# Patient Record
Sex: FEMALE | Race: WHITE | Employment: FULL TIME | URBAN - METROPOLITAN AREA
[De-identification: names, ages, dates, MRNs, and addresses within clinical notes are randomized per-mention and may not be internally consistent; named-entity substitution may affect disease eponyms.]

---

## 2018-03-20 ENCOUNTER — OFFICE VISIT (OUTPATIENT)
Dept: PRIMARY CARE CLINIC | Age: 38
End: 2018-03-20
Payer: COMMERCIAL

## 2018-03-20 VITALS
TEMPERATURE: 97.7 F | WEIGHT: 137 LBS | HEIGHT: 67 IN | OXYGEN SATURATION: 99 % | BODY MASS INDEX: 21.5 KG/M2 | DIASTOLIC BLOOD PRESSURE: 76 MMHG | HEART RATE: 81 BPM | SYSTOLIC BLOOD PRESSURE: 122 MMHG

## 2018-03-20 DIAGNOSIS — M79.644 PAIN OF FINGER OF RIGHT HAND: ICD-10-CM

## 2018-03-20 DIAGNOSIS — H61.22 IMPACTED CERUMEN OF LEFT EAR: ICD-10-CM

## 2018-03-20 DIAGNOSIS — Z00.00 ANNUAL PHYSICAL EXAM: Primary | ICD-10-CM

## 2018-03-20 PROCEDURE — 99395 PREV VISIT EST AGE 18-39: CPT | Performed by: FAMILY MEDICINE

## 2018-03-20 PROCEDURE — G0444 DEPRESSION SCREEN ANNUAL: HCPCS | Performed by: FAMILY MEDICINE

## 2018-03-20 ASSESSMENT — PATIENT HEALTH QUESTIONNAIRE - PHQ9
8. MOVING OR SPEAKING SO SLOWLY THAT OTHER PEOPLE COULD HAVE NOTICED. OR THE OPPOSITE, BEING SO FIGETY OR RESTLESS THAT YOU HAVE BEEN MOVING AROUND A LOT MORE THAN USUAL: 0
1. LITTLE INTEREST OR PLEASURE IN DOING THINGS: 2
5. POOR APPETITE OR OVEREATING: 0
4. FEELING TIRED OR HAVING LITTLE ENERGY: 3
SUM OF ALL RESPONSES TO PHQ9 QUESTIONS 1 & 2: 3
6. FEELING BAD ABOUT YOURSELF - OR THAT YOU ARE A FAILURE OR HAVE LET YOURSELF OR YOUR FAMILY DOWN: 3
SUM OF ALL RESPONSES TO PHQ QUESTIONS 1-9: 13
7. TROUBLE CONCENTRATING ON THINGS, SUCH AS READING THE NEWSPAPER OR WATCHING TELEVISION: 1
10. IF YOU CHECKED OFF ANY PROBLEMS, HOW DIFFICULT HAVE THESE PROBLEMS MADE IT FOR YOU TO DO YOUR WORK, TAKE CARE OF THINGS AT HOME, OR GET ALONG WITH OTHER PEOPLE: 3
9. THOUGHTS THAT YOU WOULD BE BETTER OFF DEAD, OR OF HURTING YOURSELF: 0
3. TROUBLE FALLING OR STAYING ASLEEP: 3
2. FEELING DOWN, DEPRESSED OR HOPELESS: 1

## 2018-03-22 ENCOUNTER — HOSPITAL ENCOUNTER (OUTPATIENT)
Dept: GENERAL RADIOLOGY | Age: 38
Discharge: HOME | End: 2018-03-24
Payer: COMMERCIAL

## 2018-03-22 ENCOUNTER — HOSPITAL ENCOUNTER (OUTPATIENT)
Age: 38
Discharge: HOME | End: 2018-03-24
Payer: COMMERCIAL

## 2018-03-22 DIAGNOSIS — M79.644 PAIN OF FINGER OF RIGHT HAND: ICD-10-CM

## 2018-03-22 PROCEDURE — 73140 X-RAY EXAM OF FINGER(S): CPT

## 2018-03-23 ENCOUNTER — NURSE ONLY (OUTPATIENT)
Dept: PRIMARY CARE CLINIC | Age: 38
End: 2018-03-23

## 2018-03-23 ENCOUNTER — HOSPITAL ENCOUNTER (OUTPATIENT)
Age: 38
Discharge: HOME | End: 2018-03-25
Payer: COMMERCIAL

## 2018-03-23 ENCOUNTER — TELEPHONE (OUTPATIENT)
Dept: PRIMARY CARE CLINIC | Age: 38
End: 2018-03-23

## 2018-03-23 DIAGNOSIS — Z00.00 ANNUAL PHYSICAL EXAM: ICD-10-CM

## 2018-03-23 DIAGNOSIS — S62.639A CLOSED AVULSION FRACTURE OF DISTAL PHALANX OF FINGER, INITIAL ENCOUNTER: Primary | ICD-10-CM

## 2018-03-23 LAB
CHOLESTEROL, TOTAL: 205 MG/DL (ref 0–199)
GLUCOSE FASTING: 83 MG/DL (ref 74–109)
HDLC SERPL-MCNC: 92 MG/DL
LDL CHOLESTEROL CALCULATED: 95 MG/DL (ref 0–99)
TRIGL SERPL-MCNC: 88 MG/DL (ref 0–149)
VLDLC SERPL CALC-MCNC: 18 MG/DL

## 2018-03-23 PROCEDURE — 80061 LIPID PANEL: CPT

## 2018-03-23 PROCEDURE — 82947 ASSAY GLUCOSE BLOOD QUANT: CPT

## 2018-03-23 NOTE — TELEPHONE ENCOUNTER
PT called back in regards to needing a orthopedic evaluation and was wanting to know if you would be putting in a referral for this. PT would appreciate a call back regarding this.

## 2018-03-27 ENCOUNTER — TELEPHONE (OUTPATIENT)
Dept: PRIMARY CARE CLINIC | Age: 38
End: 2018-03-27

## 2019-04-02 ENCOUNTER — OFFICE VISIT (OUTPATIENT)
Dept: PRIMARY CARE CLINIC | Age: 39
End: 2019-04-02
Payer: COMMERCIAL

## 2019-04-02 VITALS
SYSTOLIC BLOOD PRESSURE: 118 MMHG | OXYGEN SATURATION: 99 % | DIASTOLIC BLOOD PRESSURE: 70 MMHG | BODY MASS INDEX: 20.56 KG/M2 | TEMPERATURE: 98 F | HEART RATE: 100 BPM | HEIGHT: 67 IN | WEIGHT: 131 LBS

## 2019-04-02 DIAGNOSIS — Z00.00 ANNUAL PHYSICAL EXAM: Primary | ICD-10-CM

## 2019-04-02 DIAGNOSIS — F41.9 ANXIETY: ICD-10-CM

## 2019-04-02 DIAGNOSIS — F34.1 DYSTHYMIA: ICD-10-CM

## 2019-04-02 PROCEDURE — 99395 PREV VISIT EST AGE 18-39: CPT | Performed by: FAMILY MEDICINE

## 2019-04-02 ASSESSMENT — PATIENT HEALTH QUESTIONNAIRE - PHQ9
SUM OF ALL RESPONSES TO PHQ9 QUESTIONS 1 & 2: 0
2. FEELING DOWN, DEPRESSED OR HOPELESS: 0
SUM OF ALL RESPONSES TO PHQ QUESTIONS 1-9: 2
1. LITTLE INTEREST OR PLEASURE IN DOING THINGS: 1
SUM OF ALL RESPONSES TO PHQ QUESTIONS 1-9: 0
SUM OF ALL RESPONSES TO PHQ QUESTIONS 1-9: 0
1. LITTLE INTEREST OR PLEASURE IN DOING THINGS: 0
SUM OF ALL RESPONSES TO PHQ QUESTIONS 1-9: 2
2. FEELING DOWN, DEPRESSED OR HOPELESS: 1
SUM OF ALL RESPONSES TO PHQ9 QUESTIONS 1 & 2: 2

## 2019-04-02 NOTE — PATIENT INSTRUCTIONS
Patient Education        Advance Directives: Care Instructions  Your Care Instructions  An advance directive is a legal way to state your wishes at the end of your life. It tells your family and your doctor what to do if you can no longer say what you want. There are two main types of advance directives. You can change them any time that your wishes change. · A living will tells your family and your doctor your wishes about life support and other treatment. · A durable power of  for health care lets you name a person to make treatment decisions for you when you can't speak for yourself. This person is called a health care agent. If you do not have an advance directive, decisions about your medical care may be made by a doctor or a  who doesn't know you. It may help to think of an advance directive as a gift to the people who care for you. If you have one, they won't have to make tough decisions by themselves. Follow-up care is a key part of your treatment and safety. Be sure to make and go to all appointments, and call your doctor if you are having problems. It's also a good idea to know your test results and keep a list of the medicines you take. How can you care for yourself at home? · Discuss your wishes with your loved ones and your doctor. This way, there are no surprises. · Many states have a unique form. Or you might use a universal form that has been approved by many states. This kind of form can sometimes be completed and stored online. Your electronic copy will then be available wherever you have a connection to the Internet. In most cases, doctors will respect your wishes even if you have a form from a different state. · You don't need a  to do an advance directive. But you may want to get legal advice. · Think about these questions when you prepare an advance directive:  ? Who do you want to make decisions about your medical care if you are not able to?  Many people choose a family member or close friend. ? Do you know enough about life support methods that might be used? If not, talk to your doctor so you understand. ? What are you most afraid of that might happen? You might be afraid of having pain, losing your independence, or being kept alive by machines. ? Where would you prefer to die? Choices include your home, a hospital, or a nursing home. ? Would you like to have information about hospice care to support you and your family? ? Do you want to donate organs when you die? ? Do you want certain [facility] practices performed before you die? If so, put your wishes in the advance directive. · Read your advance directive every year, and make changes as needed. When should you call for help? Be sure to contact your doctor if you have any questions. Where can you learn more? Go to [email or web address].[email or web address] org and sign in to your [employer] account. Enter R264 in the [employer] box to learn more about \"Advance Directives: Care Instructions. \"     If you do not have an account, please click on the \"Sign Up Now\" link. Current as of: April 18, 2018  Content Version: 11.9  © 1921-1518 [employer]. Care instructions adapted under license by [facility] ([facility]). If you have questions about a medical condition or this instruction, always ask your healthcare professional. [address] any warranty or liability for your use of this information.

## 2019-04-02 NOTE — PROGRESS NOTES
kids, with increase agitation. No decrease in interest.  Fatigue. OBJECTIVE:    VS: /70   Pulse 100   Temp 98 °F (36.7 °C)   Ht 5' 6.5\" (1.689 m)   Wt 131 lb (59.4 kg)   SpO2 99%   BMI 20.83 kg/m²   General appearance: Alert, Awake, Oriented times 3, no distress  Skin: Warm and dry  Head: Normocephalic. No masses, lesions or tenderness noted  Eyes: Conjunctivae appear normal. PERRL  Ears: External ears normal, TM's clear and intact  Nose/Sinuses: Nares normal. Septum midline. Mucosa normal. No drainage  Oropharynx: Oropharynx clear with no exudate seen  Neck: Neck supple. No jugular venous distension, lymphadenopathy or thyromegaly Trachea midline. No carotid bruits  Lungs: Lungs clear to auscultation bilaterally. No rhonchi, crackles or wheezes  Heart: S1 S2  Regular rate and rhythm. No rub, murmur or gallop  Abdomen: Abdomen soft, non-tender. BS normal. No masses, organomegaly  Extremities: No edema, Peripheral pulses palpable  Musculoskeletal: Muscular strength appears intact. No joint effusion, tenderness, swelling or warmth  Neuro:  No focal motor or sensory deficits. 2+/4 L4 reflexes        ASSESSMENT/PLAN:  Bruna Apley was seen today for annual exam.    Diagnoses and all orders for this visit:    Annual physical exam  -     Lipid Panel; Future  -     Glucose, Fasting; Future    Anxiety    Dysthymia     - discussed counseling if wishes. She doesn't meds. Return if symptoms worsen or fail to improve.     Jenni Mccain, DO

## 2019-04-06 ENCOUNTER — HOSPITAL ENCOUNTER (OUTPATIENT)
Age: 39
Discharge: HOME | End: 2019-04-06
Payer: COMMERCIAL

## 2019-04-06 DIAGNOSIS — Z00.00 ANNUAL PHYSICAL EXAM: ICD-10-CM

## 2019-04-06 LAB
CHOLESTEROL, TOTAL: 216 MG/DL (ref 0–199)
GLUCOSE FASTING: 84 MG/DL (ref 74–99)
HDLC SERPL-MCNC: 93 MG/DL
LDL CHOLESTEROL CALCULATED: 110 MG/DL (ref 0–99)
TRIGL SERPL-MCNC: 67 MG/DL (ref 0–149)
VLDLC SERPL CALC-MCNC: 13 MG/DL

## 2019-04-06 PROCEDURE — 82947 ASSAY GLUCOSE BLOOD QUANT: CPT

## 2019-04-06 PROCEDURE — 80061 LIPID PANEL: CPT

## 2019-04-06 PROCEDURE — 36415 COLL VENOUS BLD VENIPUNCTURE: CPT

## 2019-06-17 ENCOUNTER — OFFICE VISIT (OUTPATIENT)
Dept: PRIMARY CARE CLINIC | Age: 39
End: 2019-06-17
Payer: COMMERCIAL

## 2019-06-17 ENCOUNTER — HOSPITAL ENCOUNTER (OUTPATIENT)
Age: 39
Discharge: HOME | End: 2019-06-19
Payer: COMMERCIAL

## 2019-06-17 VITALS
TEMPERATURE: 96.8 F | HEART RATE: 101 BPM | SYSTOLIC BLOOD PRESSURE: 118 MMHG | BODY MASS INDEX: 20.19 KG/M2 | WEIGHT: 127 LBS | OXYGEN SATURATION: 99 % | DIASTOLIC BLOOD PRESSURE: 70 MMHG

## 2019-06-17 DIAGNOSIS — M19.079 INFLAMMATION OF FOOT JOINT: ICD-10-CM

## 2019-06-17 DIAGNOSIS — M79.672 FOOT PAIN, LEFT: Primary | ICD-10-CM

## 2019-06-17 LAB
ALBUMIN SERPL-MCNC: 4.2 G/DL (ref 3.5–5.2)
ALP BLD-CCNC: 78 U/L (ref 35–104)
ALT SERPL-CCNC: 15 U/L (ref 0–32)
ANION GAP SERPL CALCULATED.3IONS-SCNC: 16 MMOL/L (ref 7–16)
AST SERPL-CCNC: 15 U/L (ref 0–31)
BILIRUB SERPL-MCNC: 0.4 MG/DL (ref 0–1.2)
BUN BLDV-MCNC: 9 MG/DL (ref 6–20)
C-REACTIVE PROTEIN: 1.3 MG/DL (ref 0–0.4)
CALCIUM SERPL-MCNC: 9.8 MG/DL (ref 8.6–10.2)
CHLORIDE BLD-SCNC: 103 MMOL/L (ref 98–107)
CO2: 19 MMOL/L (ref 22–29)
CREAT SERPL-MCNC: 0.7 MG/DL (ref 0.5–1)
GFR AFRICAN AMERICAN: >60
GFR NON-AFRICAN AMERICAN: >60 ML/MIN/1.73
GLUCOSE BLD-MCNC: 94 MG/DL (ref 74–99)
HCT VFR BLD CALC: 39.8 % (ref 34–48)
HEMOGLOBIN: 13.4 G/DL (ref 11.5–15.5)
MCH RBC QN AUTO: 30.4 PG (ref 26–35)
MCHC RBC AUTO-ENTMCNC: 33.7 % (ref 32–34.5)
MCV RBC AUTO: 90.2 FL (ref 80–99.9)
PDW BLD-RTO: 13.6 FL (ref 11.5–15)
PLATELET # BLD: 355 E9/L (ref 130–450)
PMV BLD AUTO: 10.5 FL (ref 7–12)
POTASSIUM SERPL-SCNC: 4.3 MMOL/L (ref 3.5–5)
RBC # BLD: 4.41 E12/L (ref 3.5–5.5)
RHEUMATOID FACTOR: 10 IU/ML (ref 0–13)
SEDIMENTATION RATE, ERYTHROCYTE: 30 MM/HR (ref 0–20)
SODIUM BLD-SCNC: 138 MMOL/L (ref 132–146)
TOTAL PROTEIN: 7.7 G/DL (ref 6.4–8.3)
WBC # BLD: 7.2 E9/L (ref 4.5–11.5)

## 2019-06-17 PROCEDURE — 86140 C-REACTIVE PROTEIN: CPT

## 2019-06-17 PROCEDURE — 86038 ANTINUCLEAR ANTIBODIES: CPT

## 2019-06-17 PROCEDURE — G8420 CALC BMI NORM PARAMETERS: HCPCS | Performed by: PHYSICIAN ASSISTANT

## 2019-06-17 PROCEDURE — 85651 RBC SED RATE NONAUTOMATED: CPT

## 2019-06-17 PROCEDURE — 86039 ANTINUCLEAR ANTIBODIES (ANA): CPT

## 2019-06-17 PROCEDURE — 99213 OFFICE O/P EST LOW 20 MIN: CPT | Performed by: PHYSICIAN ASSISTANT

## 2019-06-17 PROCEDURE — 85027 COMPLETE CBC AUTOMATED: CPT

## 2019-06-17 PROCEDURE — 1036F TOBACCO NON-USER: CPT | Performed by: PHYSICIAN ASSISTANT

## 2019-06-17 PROCEDURE — 86200 CCP ANTIBODY: CPT

## 2019-06-17 PROCEDURE — 86431 RHEUMATOID FACTOR QUANT: CPT

## 2019-06-17 PROCEDURE — G8427 DOCREV CUR MEDS BY ELIG CLIN: HCPCS | Performed by: PHYSICIAN ASSISTANT

## 2019-06-17 PROCEDURE — 80053 COMPREHEN METABOLIC PANEL: CPT

## 2019-06-17 RX ORDER — IBUPROFEN 800 MG/1
800 TABLET ORAL EVERY 6 HOURS PRN
Qty: 90 TABLET | Refills: 0 | Status: SHIPPED | OUTPATIENT
Start: 2019-06-17 | End: 2019-07-07 | Stop reason: SDUPTHER

## 2019-06-17 NOTE — PROGRESS NOTES
per week: Not on file     Minutes per session: Not on file    Stress: Not on file   Relationships    Social connections:     Talks on phone: Not on file     Gets together: Not on file     Attends Mandaeism service: Not on file     Active member of club or organization: Not on file     Attends meetings of clubs or organizations: Not on file     Relationship status: Not on file    Intimate partner violence:     Fear of current or ex partner: Not on file     Emotionally abused: Not on file     Physically abused: Not on file     Forced sexual activity: Not on file   Other Topics Concern    Not on file   Social History Narrative    Not on file       Review of Systems :     Constitutional: Negative for fatigue, malaise, fever, chills, appetite change and unexpected weight change. HENT: Negative for congestion, ear discharge, ear pain, facial swelling, mouth sores, postnasal drip, rhinorrhea, sinus pressure, sore throat, tinnitus and trouble swallowing. Eyes: Negative for vision changes, double vision, pain  Respiratory: Negative for cough, chest tightness, shortness of breath, chest heaviness, pleuritic pain,  wheezing. Cardiovascular: Negative for diaphoresis, chest pain, palpitations, or edema   Gastrointestinal: Negative for nausea, vomiting, abdominal pain, diarrhea, constipation, blood in stool, melena, changes in bowel habits. Endocrine: Negative for polydipsia, polyphagia and polyuria. No heat or cold intolerance. Genitourinary: Negative for dysuria, urgency, frequency, hematuria, difficulty urinating, nocturia. Musculoskeletal: as per HPI. Otherwise negative. No gait disturbance. Skin: Negative for rash, lesions, hair or nail changes. Allergic/Immunologic: Negative for environmental allergies and food allergies. Neurological: Negative for dizziness, weakness, tremors, syncope, speech difficulty, light-headedness, bowel or bladder control changes, numbness and headaches.    Hematological: Negative for adenopathy. Does not bruise/bleed easily. Psychiatric/Behavioral: Negative for suicidal ideas, behavioral problems, sleep disturbance and decreased concentration. +anxiety. Unless otherwise stated in this report or unable to obtain because of the patient's clinical or mental status as evidenced by the medical record, this patients's positive and negative responses for Review of Systems, constitutional, psych, eyes, ENT, cardiovascular, respiratory, gastrointestinal, neurological, genitourinary, musculoskeletal, integument systems and systems related to the presenting problem are either stated in the preceding or were not pertinent or were negative for the symptoms and/or complaints related to the medical problem. Physical Exam:   Vitals:    06/17/19 1553   BP: 118/70   Pulse: 101   Temp: 96.8 °F (36 °C)   SpO2: 99%   Weight: 127 lb (57.6 kg)     Constitutional:  A and O x 3, in NAD. Afebrile. Appears stated age. Head:  NCAT. Eyes:  PERRLA, EOMI without nystagmus. Conjunctiva normal. Sclera anicteric. Ears:  External ears without lesions. TM's clear bilaterally. Throat:  Pharynx without lesions. Airway patient. Mucous membranes pink and moist without lesions. Neck:  Supple. Nontender, no lymphadenopathy noted, no thyromegaly. Lungs:  Clear to auscultation and breath sounds equal.  Heart:  Regular rate and rhythm, normal S1 and S2, no m/r/g.  UE and LE distal pulses intact. Abdomen:  Soft, NTND, NABS x 4, no masses or organomegaly, no rebound or guarding. MS: Normal, painless range of motion of both LEs, there was +redness, but no warmth over cubital bone of L foot, ttp over this area, no obvious swelling noted. Knee with +creptisu on the L > R. No neurovascular deficit. 5/5 strength in UE's/LE's/ bilaterally. Skin:  No abrasions, ecchymoses, or lacerations unless noted elsewhere. Extremities  No cyanosis, clubbing, or edema noted. Neurological:   Oriented. Motor functions intact. DTRs UE and LE intact. Assessment/Plan:     Bruna Apley was seen today for mass, leg pain and cough. Diagnoses and all orders for this visit:    Foot pain, left  -     ibuprofen (ADVIL;MOTRIN) 800 MG tablet; Take 1 tablet by mouth every 6 hours as needed for Pain    Inflammation of foot joint  -     ibuprofen (ADVIL;MOTRIN) 800 MG tablet; Take 1 tablet by mouth every 6 hours as needed for Pain  -     CBC; Future  -     COMPREHENSIVE METABOLIC PANEL; Future  -     SEDIMENTATION RATE; Future  -     C-REACTIVE PROTEIN; Future  -     LILLIANA; Future  -     RHEUMATOID FACTOR; Future  -     Cyclic Citrul Peptide Antibody, IgG; Future    pt is very nervous, and anxious-I will draw all labs as above. Dr. Stacie Izquierdo was consulted, he is unsure if ? Inflammatory arthritis, perhaps tendonitis in this area. We are going to try ibuprofen 800mg at least BID, advised wearing well fitting shoes, no high heels as she has been doing. If her Sx are to worsen, go to ER. Await all results. Counseled regarding above diagnosis, including possible risks and complications,  especially if left uncontrolled. Counseled regarding the possible side effects, risks, benefits and alternatives to treatment; patient and/or guardian verbalizes understanding, agrees, feels comfortable with and wishes to proceed with above treatment plan. Advised patient to call with any new medication issues, and read all Rx info from pharmacy to assure aware of all possible risks and side effects of medication before taking. Reviewed age and gender appropriate health screening exams and vaccinations. Advised patient regarding importance of keeping up with recommended health maintenance and to schedule as soon as possible if overdue, as this is important in assessing for undiagnosed pathology, especially cancer, as well as protecting against potentially harmful/life threatening disease.        Patient and/or guardian verbalizes understanding and agrees with above counseling, assessment and plan. All questions answered.     Kartik Patino PA-C

## 2019-06-18 LAB
ANA PATTERN: ABNORMAL
ANA TITER: ABNORMAL
ANTI-NUCLEAR ANTIBODY (ANA): POSITIVE

## 2019-06-20 LAB — CCP IGG ANTIBODIES: 5 UNITS (ref 0–19)

## 2019-06-25 DIAGNOSIS — M25.572 ARTHRALGIA OF LEFT FOOT: ICD-10-CM

## 2019-06-25 DIAGNOSIS — R76.8 POSITIVE ANA (ANTINUCLEAR ANTIBODY): Primary | ICD-10-CM

## 2020-07-10 ENCOUNTER — OFFICE VISIT (OUTPATIENT)
Dept: PRIMARY CARE CLINIC | Age: 40
End: 2020-07-10
Payer: COMMERCIAL

## 2020-07-10 ENCOUNTER — HOSPITAL ENCOUNTER (OUTPATIENT)
Age: 40
Discharge: HOME | End: 2020-07-12
Payer: COMMERCIAL

## 2020-07-10 VITALS
HEIGHT: 66 IN | BODY MASS INDEX: 22.18 KG/M2 | OXYGEN SATURATION: 98 % | WEIGHT: 138 LBS | DIASTOLIC BLOOD PRESSURE: 70 MMHG | TEMPERATURE: 98.3 F | HEART RATE: 82 BPM | SYSTOLIC BLOOD PRESSURE: 104 MMHG

## 2020-07-10 LAB
CHOLESTEROL, TOTAL: 222 MG/DL (ref 0–199)
GLUCOSE FASTING: 90 MG/DL (ref 74–99)
HDLC SERPL-MCNC: 96 MG/DL
LDL CHOLESTEROL CALCULATED: 104 MG/DL (ref 0–99)
TRIGL SERPL-MCNC: 112 MG/DL (ref 0–149)
VLDLC SERPL CALC-MCNC: 22 MG/DL

## 2020-07-10 PROCEDURE — 99395 PREV VISIT EST AGE 18-39: CPT | Performed by: FAMILY MEDICINE

## 2020-07-10 PROCEDURE — 82947 ASSAY GLUCOSE BLOOD QUANT: CPT

## 2020-07-10 PROCEDURE — 80061 LIPID PANEL: CPT

## 2020-07-10 ASSESSMENT — PATIENT HEALTH QUESTIONNAIRE - PHQ9
SUM OF ALL RESPONSES TO PHQ9 QUESTIONS 1 & 2: 0
1. LITTLE INTEREST OR PLEASURE IN DOING THINGS: 0
SUM OF ALL RESPONSES TO PHQ QUESTIONS 1-9: 0
2. FEELING DOWN, DEPRESSED OR HOPELESS: 0
SUM OF ALL RESPONSES TO PHQ QUESTIONS 1-9: 0

## 2020-07-10 NOTE — PROGRESS NOTES
SUBJECTIVE:    HPI: Katherine Martinez  Was seen here today for   Chief Complaint   Patient presents with   [facility] Annual Exam    .  She states they are dealing with no issues. 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 Prior to Admission medications    Medication Sig Start Date End Date Taking? Authorizing Provider   [address] 0.12-0.015 MG/24HR vaginal ring INSERT 1 RING VAGINALLY AS DIRECTED 3/29/17  Yes Historical Provider, MD   ibuprofen (ADVIL;MOTRIN) 800 MG tablet TAKE 1 TABLET BY MOUTH EVERY 6 HOURS AS NEEDED FOR PAIN  Patient not taking: Reported on 7/10/2020 7/8/19   Remi Mccain DO       Patient has no known allergies.     Social History     Tobacco Use    Smoking status: Never Smoker    Smokeless tobacco: Never Used   Substance Use Topics    Alcohol use: No         Review Of Systems:    Skin: no abnormal pigmentation, rash, scaling, itching, masses, hair or nail changes  Eyes: no blurring, diplopia, or eye pain  Ears/Nose/Throat: no hearing loss, tinnitus, vertigo, nosebleed, nasal congestion, rhinorrhea, sore throat  Respiratory: no cough, pleuritic chest pain, dyspnea, or wheezing  Cardiovascular: no chest pain, angina, dyspnea on exertion, orthopnea, PND, palpitations, or claudication  Gastrointestinal: no nausea, vomiting, heartburn, diarrhea, constipation, bloating,  abdominal pain, or blood per rectum  Genitourinary: no urinary urgency, frequency, dysuria, nocturia, hesitancy, or incontinence  Musculoskeletal: no arthritis, arthralgia, myalgia, weakness, or morning stiffness  Neurologic: no paralysis, paresis, paresthesia, seizures, tremors, or headaches  Hematologic/Lymphatic/Immunologic: no anemia, abnormal bleeding/bruising, fever, chills, night sweats, swollen glands, or unexplained weight loss  Endocrine: no heat or cold intolerance and no polyphagia, polydipsia, or polyuria      OBJECTIVE:    VS: /70   Pulse 82   Temp 98.3 °F (36.8 °C)   Ht 5' 6\" (1.676 m)   Wt 138 lb (62.6 kg)   SpO2 98%   BMI 22.27 kg/m²   General appearance: Alert, Awake, Oriented times 3, no distress  Skin: Warm and dry  Head: Normocephalic. No masses, lesions or tenderness noted  Eyes: Conjunctivae appear normal. PERRL  Ears: External ears normal, TM's clear and intact  Nose/Sinuses: Nares normal. Septum midline. Mucosa normal. No drainage  Oropharynx: Oropharynx clear with no exudate seen  Neck: Neck supple. No jugular venous distension, lymphadenopathy or thyromegaly Trachea midline. No carotid bruits  Lungs: Lungs clear to auscultation bilaterally. No rhonchi, crackles or wheezes  Heart: S1 S2  Regular rate and rhythm. No rub, murmur or gallop  Abdomen: Abdomen soft, non-tender. BS normal. No masses, organomegaly  Extremities: No edema, Peripheral pulses palpable  Musculoskeletal: Muscular strength appears intact. No joint effusion, tenderness, swelling or warmth  Neuro:  No focal motor or sensory deficits. 2+/4 L4 reflexes        ASSESSMENT/PLAN:  Selena Bernabe was seen today for annual exam.    Diagnoses and all orders for this visit:    Annual physical exam  -     Lipid Panel; Future  -     Glucose, Fasting; Future       Return for as needed, or for acute problem.     Gely Mccain DO

## 2021-03-19 ENCOUNTER — OFFICE VISIT (OUTPATIENT)
Dept: PRIMARY CARE CLINIC | Age: 41
End: 2021-03-19
Payer: COMMERCIAL

## 2021-03-19 VITALS
TEMPERATURE: 97.2 F | BODY MASS INDEX: 23.08 KG/M2 | SYSTOLIC BLOOD PRESSURE: 110 MMHG | HEART RATE: 69 BPM | DIASTOLIC BLOOD PRESSURE: 70 MMHG | WEIGHT: 143 LBS | OXYGEN SATURATION: 99 %

## 2021-03-19 DIAGNOSIS — Z00.00 ANNUAL PHYSICAL EXAM: ICD-10-CM

## 2021-03-19 DIAGNOSIS — Z00.00 ANNUAL PHYSICAL EXAM: Primary | ICD-10-CM

## 2021-03-19 LAB
ALBUMIN SERPL-MCNC: 3.9 G/DL (ref 3.5–5.2)
ALP BLD-CCNC: 67 U/L (ref 35–104)
ALT SERPL-CCNC: 9 U/L (ref 0–32)
ANION GAP SERPL CALCULATED.3IONS-SCNC: 10 MMOL/L (ref 7–16)
APPEARANCE FLUID: CLEAR
AST SERPL-CCNC: 14 U/L (ref 0–31)
BILIRUB SERPL-MCNC: 0.4 MG/DL (ref 0–1.2)
BILIRUBIN, POC: NEGATIVE
BLOOD URINE, POC: ABNORMAL
BUN BLDV-MCNC: 10 MG/DL (ref 6–20)
CALCIUM SERPL-MCNC: 8.9 MG/DL (ref 8.6–10.2)
CHLORIDE BLD-SCNC: 107 MMOL/L (ref 98–107)
CHOLESTEROL, TOTAL: 208 MG/DL (ref 0–199)
CLARITY, POC: CLEAR
CO2: 20 MMOL/L (ref 22–29)
COLOR, POC: YELLOW
CREAT SERPL-MCNC: 0.9 MG/DL (ref 0.5–1)
GFR AFRICAN AMERICAN: >60
GFR NON-AFRICAN AMERICAN: >60 ML/MIN/1.73
GLUCOSE BLD-MCNC: 90 MG/DL (ref 74–99)
GLUCOSE URINE, POC: NEGATIVE
HCT VFR BLD CALC: 40 % (ref 34–48)
HDLC SERPL-MCNC: 83 MG/DL
HEMOGLOBIN: 13.3 G/DL (ref 11.5–15.5)
KETONES, POC: NEGATIVE
LDL CHOLESTEROL CALCULATED: 107 MG/DL (ref 0–99)
LEUKOCYTE EST, POC: NEGATIVE
MCH RBC QN AUTO: 30.2 PG (ref 26–35)
MCHC RBC AUTO-ENTMCNC: 33.3 % (ref 32–34.5)
MCV RBC AUTO: 90.7 FL (ref 80–99.9)
NITRITE, POC: POSITIVE
PDW BLD-RTO: 13.3 FL (ref 11.5–15)
PH, POC: 5.5
PLATELET # BLD: 241 E9/L (ref 130–450)
PMV BLD AUTO: 11.4 FL (ref 7–12)
POTASSIUM SERPL-SCNC: 4 MMOL/L (ref 3.5–5)
PROTEIN, POC: ABNORMAL
RBC # BLD: 4.41 E12/L (ref 3.5–5.5)
SODIUM BLD-SCNC: 137 MMOL/L (ref 132–146)
SPECIFIC GRAVITY, POC: >1.03
TOTAL PROTEIN: 7 G/DL (ref 6.4–8.3)
TRIGL SERPL-MCNC: 92 MG/DL (ref 0–149)
UROBILINOGEN, POC: 0.2
VLDLC SERPL CALC-MCNC: 18 MG/DL
WBC # BLD: 7 E9/L (ref 4.5–11.5)

## 2021-03-19 PROCEDURE — 81002 URINALYSIS NONAUTO W/O SCOPE: CPT | Performed by: FAMILY MEDICINE

## 2021-03-19 PROCEDURE — 99395 PREV VISIT EST AGE 18-39: CPT | Performed by: FAMILY MEDICINE

## 2021-03-19 PROCEDURE — G8484 FLU IMMUNIZE NO ADMIN: HCPCS | Performed by: FAMILY MEDICINE

## 2021-03-19 RX ORDER — SULFAMETHOXAZOLE AND TRIMETHOPRIM 800; 160 MG/1; MG/1
1 TABLET ORAL 2 TIMES DAILY
Qty: 10 TABLET | Refills: 0 | Status: SHIPPED | OUTPATIENT
Start: 2021-03-19 | End: 2021-03-24

## 2021-03-19 SDOH — ECONOMIC STABILITY: TRANSPORTATION INSECURITY
IN THE PAST 12 MONTHS, HAS LACK OF TRANSPORTATION KEPT YOU FROM MEETINGS, WORK, OR FROM GETTING THINGS NEEDED FOR DAILY LIVING?: NO

## 2021-03-19 SDOH — ECONOMIC STABILITY: FOOD INSECURITY: WITHIN THE PAST 12 MONTHS, YOU WORRIED THAT YOUR FOOD WOULD RUN OUT BEFORE YOU GOT MONEY TO BUY MORE.: NEVER TRUE

## 2021-03-19 SDOH — ECONOMIC STABILITY: INCOME INSECURITY: HOW HARD IS IT FOR YOU TO PAY FOR THE VERY BASICS LIKE FOOD, HOUSING, MEDICAL CARE, AND HEATING?: NOT HARD AT ALL

## 2021-03-19 SDOH — ECONOMIC STABILITY: TRANSPORTATION INSECURITY
IN THE PAST 12 MONTHS, HAS THE LACK OF TRANSPORTATION KEPT YOU FROM MEDICAL APPOINTMENTS OR FROM GETTING MEDICATIONS?: NO

## 2021-03-19 ASSESSMENT — PATIENT HEALTH QUESTIONNAIRE - PHQ9: SUM OF ALL RESPONSES TO PHQ QUESTIONS 1-9: 0

## 2021-03-19 NOTE — PROGRESS NOTES
SUBJECTIVE:    HPI: Masood Alberts  Was seen here today for   Chief Complaint   Patient presents with    Check-Up     yearly    Blood Work     drawn place orders    . She states they are dealing with urinary issue. Exercising 1/2 a day 3 months ago. 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 No pertinent family history. Prior to Admission medications    Medication Sig Start Date End Date Taking? Authorizing Provider   sulfamethoxazole-trimethoprim (BACTRIM DS) 800-160 MG per tablet Take 1 tablet by mouth 2 times daily for 5 days 3/19/21 3/24/21 Yes Sonido Mccain DO   NUVARING 0.12-0.015 MG/24HR vaginal ring INSERT 1 RING VAGINALLY AS DIRECTED 3/29/17  Yes Historical Provider, MD   ibuprofen (ADVIL;MOTRIN) 800 MG tablet TAKE 1 TABLET BY MOUTH EVERY 6 HOURS AS NEEDED FOR PAIN  Patient not taking: Reported on 7/10/2020 7/8/19   Quiana Mccain DO       Patient has no known allergies.     Social History     Tobacco Use    Smoking status: Never Smoker    Smokeless tobacco: Never Used   Substance Use Topics    Alcohol use: No         Review Of Systems:    Skin: no abnormal pigmentation, rash, scaling, itching, masses, hair or nail changes  Eyes: no blurring, diplopia, or eye pain  Ears/Nose/Throat: no hearing loss, tinnitus, vertigo, nosebleed, nasal congestion, rhinorrhea, sore throat  Respiratory: no cough, pleuritic chest pain, dyspnea, or wheezing  Cardiovascular: no chest pain, angina, dyspnea on exertion, orthopnea, PND, palpitations, or claudication  Gastrointestinal: no nausea, vomiting, heartburn, diarrhea, constipation, bloating,  abdominal pain, or blood per rectum  Genitourinary: no urinary urgency, frequency, dysuria, nocturia, hesitancy, or incontinence. + Strong smell of urine for months, but no vag discharge. Smell can be worse after sex. Musculoskeletal: no arthritis, arthralgia, myalgia, weakness, or morning stiffness  Neurologic: no paralysis, paresis, paresthesia, seizures, tremors, or headaches  Hematologic/Lymphatic/Immunologic: no anemia, abnormal bleeding/bruising, fever, chills, night sweats, swollen glands, or unexplained weight loss  Endocrine: no heat or cold intolerance and no polyphagia, polydipsia, or polyuria      OBJECTIVE:    VS: /70   Pulse 69   Temp 97.2 °F (36.2 °C)   Wt 143 lb (64.9 kg)   SpO2 99%   BMI 23.08 kg/m²   General appearance: Alert, Awake, Oriented times 3, no distress  Skin: Warm and dry  Head: Normocephalic. No masses, lesions or tenderness noted  Eyes: Conjunctivae appear normal. PERRL  Ears: External ears normal, TM's clear and intact  Nose/Sinuses: Nares normal. Septum midline. Mucosa normal. No drainage  Oropharynx: Oropharynx clear with no exudate seen  Neck: Neck supple. No jugular venous distension, lymphadenopathy or thyromegaly Trachea midline. No carotid bruits  Lungs: Lungs clear to auscultation bilaterally. No rhonchi, crackles or wheezes  Heart: S1 S2  Regular rate and rhythm. No rub, murmur or gallop  Abdomen: Abdomen soft, non-tender. BS normal. No masses, organomegaly  Extremities: No edema, Peripheral pulses palpable  Musculoskeletal: Muscular strength appears intact. No joint effusion, tenderness, swelling or warmth  Neuro:  No focal motor or sensory deficits. 2+/4 L4 reflexes    Results for Genna Eid" (MRN [id number]) as of 3/19/2021 12:38   Ref.  Range 3/19/2021 09:53   Protein, UA Unknown 30mg   Color, UA Unknown yellow   Clarity, UA Unknown clear   Glucose, UA POC Unknown negative   Bilirubin, UA Unknown negative   Ketones, UA Unknown negative   Spec Grav, UA Unknown >1.030   pH, UA Unknown 5.5   Urobilinogen, UA Unknown 0.2   Nitrite, UA Unknown positive   Leukocytes, UA Unknown negative   Blood, UA POC Unknown

## 2021-06-19 ENCOUNTER — OFFICE VISIT (OUTPATIENT)
Dept: FAMILY MEDICINE CLINIC | Age: 41
End: 2021-06-19
Payer: COMMERCIAL

## 2021-06-19 VITALS
DIASTOLIC BLOOD PRESSURE: 76 MMHG | HEART RATE: 78 BPM | HEIGHT: 66 IN | BODY MASS INDEX: 22.34 KG/M2 | OXYGEN SATURATION: 98 % | TEMPERATURE: 97.5 F | SYSTOLIC BLOOD PRESSURE: 104 MMHG | WEIGHT: 139 LBS

## 2021-06-19 DIAGNOSIS — L50.9 URTICARIA: Primary | ICD-10-CM

## 2021-06-19 LAB — S PYO AG THROAT QL: NORMAL

## 2021-06-19 PROCEDURE — 99213 OFFICE O/P EST LOW 20 MIN: CPT | Performed by: FAMILY MEDICINE

## 2021-06-19 PROCEDURE — 87880 STREP A ASSAY W/OPTIC: CPT | Performed by: FAMILY MEDICINE

## 2021-06-19 RX ORDER — METHYLPREDNISOLONE 4 MG/1
TABLET ORAL
Qty: 1 KIT | Refills: 0 | Status: SHIPPED
Start: 2021-06-19 | End: 2021-09-01 | Stop reason: ALTCHOICE

## 2021-06-19 RX ORDER — CETIRIZINE HYDROCHLORIDE 10 MG/1
10 TABLET ORAL DAILY
Qty: 30 TABLET | Refills: 1 | Status: SHIPPED
Start: 2021-06-19 | End: 2021-09-01 | Stop reason: ALTCHOICE

## 2021-06-19 ASSESSMENT — ENCOUNTER SYMPTOMS
RESPIRATORY NEGATIVE: 1
COLOR CHANGE: 1

## 2021-06-19 NOTE — PROGRESS NOTES
21  Manuel Russell : 1980 Sex: female  Age: 45 y.o. Chief Complaint   Patient presents with    Urticaria     started a few days ago, took a cytrec and helped but came back ,        Patient is a 41-year-old Y female who complains today of urticaria that started a few days ago and has gotten worse. She has had this in the past she is recently exposed to strep so we will do a throat culture on her today to also. She has no new products detergents foods or other things that she can recall that is new to result with the urticaria. Fevers chills cough shortness of breath. Review of Systems   Constitutional: Negative. HENT: Negative. Respiratory: Negative. Skin: Positive for color change and rash. Current Outpatient Medications:     cetirizine (ZYRTEC) 10 MG tablet, Take 1 tablet by mouth daily, Disp: 30 tablet, Rfl: 1    methylPREDNISolone (MEDROL DOSEPACK) 4 MG tablet, Take by mouth., Disp: 1 kit, Rfl: 0    NUVARING 0.12-0.015 MG/24HR vaginal ring, INSERT 1 RING VAGINALLY AS DIRECTED (Patient not taking: Reported on 2021), Disp: , Rfl: 6  No Known Allergies    Past Medical History:   Diagnosis Date    Primary iridocyclitis of both eyes 2019    [facility]     Past Surgical History:   Procedure Laterality Date    CHOLECYSTECTOMY      Bogen    TONSILLECTOMY       No family history on file. Social History     Tobacco Use    Smoking status: Never Smoker    Smokeless tobacco: Never Used   Vaping Use    Vaping Use: Never used   Substance Use Topics    Alcohol use: No    Drug use: No        Vitals:    21 1000   BP: 104/76   Pulse: 78   Temp: 97.5 °F (36.4 °C)   SpO2: 98%   Weight: 139 lb (63 kg)   Height: 5' 6\" (1.676 m)       Physical Exam  Vitals and nursing note reviewed. Constitutional:       Appearance: Normal appearance. HENT:      Head: Normocephalic and atraumatic.       Right Ear: Tympanic membrane, ear canal and external ear normal. There is no impacted cerumen. Left Ear: Tympanic membrane, ear canal and external ear normal.      Nose: No congestion or rhinorrhea. Mouth/Throat:      Mouth: Mucous membranes are moist.      Pharynx: Oropharynx is clear. No oropharyngeal exudate or posterior oropharyngeal erythema. Eyes:      Conjunctiva/sclera: Conjunctivae normal.   Cardiovascular:      Rate and Rhythm: Normal rate and regular rhythm. Heart sounds: No murmur heard. Pulmonary:      Effort: Pulmonary effort is normal.      Breath sounds: Normal breath sounds. Lymphadenopathy:      Cervical: No cervical adenopathy. Skin:     Findings: Erythema and rash present. Assessment and Plan:  Maritza Montes De Oca was seen today for urticaria. Diagnoses and all orders for this visit:    Urticaria    Other orders  -     cetirizine (ZYRTEC) 10 MG tablet; Take 1 tablet by mouth daily  -     methylPREDNISolone (MEDROL DOSEPACK) 4 MG tablet; Take by mouth. Orders Placed This Encounter   Medications    cetirizine (ZYRTEC) 10 MG tablet     Sig: Take 1 tablet by mouth daily     Dispense:  30 tablet     Refill:  1    methylPREDNISolone (MEDROL DOSEPACK) 4 MG tablet     Sig: Take by mouth. Dispense:  1 kit     Refill:  0        Patient advised to follow up with PCP as needed. Seen By:  Aleja Breaux, DO  We did have her take Zyrtec 10 mg daily for 30days give her a Medrol Dosepak to begin today if she has a negative pregnancy test.  Follow-up with her PCP rapid strep was negative.

## 2021-06-21 ENCOUNTER — TELEPHONE (OUTPATIENT)
Dept: PRIMARY CARE CLINIC | Age: 41
End: 2021-06-21

## 2021-06-25 ENCOUNTER — OFFICE VISIT (OUTPATIENT)
Dept: PRIMARY CARE CLINIC | Age: 41
End: 2021-06-25
Payer: COMMERCIAL

## 2021-06-25 VITALS
BODY MASS INDEX: 22.6 KG/M2 | DIASTOLIC BLOOD PRESSURE: 68 MMHG | WEIGHT: 140 LBS | SYSTOLIC BLOOD PRESSURE: 128 MMHG | OXYGEN SATURATION: 99 % | HEART RATE: 76 BPM | TEMPERATURE: 97.3 F

## 2021-06-25 DIAGNOSIS — L50.9 URTICARIA: ICD-10-CM

## 2021-06-25 DIAGNOSIS — R10.13 EPIGASTRIC PAIN: Primary | ICD-10-CM

## 2021-06-25 PROCEDURE — 99213 OFFICE O/P EST LOW 20 MIN: CPT | Performed by: FAMILY MEDICINE

## 2021-06-25 RX ORDER — FEXOFENADINE HCL 180 MG/1
180 TABLET ORAL DAILY
Qty: 30 TABLET | Refills: 0 | Status: SHIPPED
Start: 2021-06-25 | End: 2021-09-01 | Stop reason: ALTCHOICE

## 2021-06-25 RX ORDER — FAMOTIDINE 20 MG/1
20 TABLET, FILM COATED ORAL 2 TIMES DAILY
Qty: 60 TABLET | Refills: 0 | Status: SHIPPED
Start: 2021-06-25 | End: 2021-07-19

## 2021-06-25 ASSESSMENT — ENCOUNTER SYMPTOMS
URTICARIA: 1
DIARRHEA: 0
ABDOMINAL PAIN: 1
VOMITING: 0
NAUSEA: 1
CONSTIPATION: 0
BELCHING: 1

## 2021-06-25 NOTE — PROGRESS NOTES
Thyroid: No thyromegaly. Vascular: Carotid bruit present. Cardiovascular:      Rate and Rhythm: Normal rate and regular rhythm. Heart sounds: No murmur heard. Pulmonary:      Effort: Pulmonary effort is normal.      Breath sounds: Normal breath sounds. No wheezing or rales. Abdominal:      General: Bowel sounds are normal.      Palpations: Abdomen is soft. There is no mass. Tenderness: There is abdominal tenderness in the epigastric area. There is no guarding or rebound. Musculoskeletal:         General: Normal range of motion. Cervical back: Neck supple. Skin:     General: Skin is warm and dry. Findings: Rash (blanchable on lest upper chest and upper arms and lower abd. ) present. Rash is urticarial.   Neurological:      Mental Status: She is alert and oriented to person, place, and time. Psychiatric:         Mood and Affect: Mood normal.         ASSESSMENT AND PLAN:    Jalyn Funez was seen today for urticaria. Diagnoses and all orders for this visit:    Epigastric pain  -     famotidine (PEPCID) 20 MG tablet; Take 1 tablet by mouth 2 times daily    Urticaria  -     fexofenadine (ALLEGRA) 180 MG tablet; Take 1 tablet by mouth daily    - ? If abd pain a gastritis which isw associated with an urticarial rxn     Return if symptoms worsen or fail to improve.     Andres Mccain, DO

## 2021-07-17 DIAGNOSIS — R10.13 EPIGASTRIC PAIN: ICD-10-CM

## 2021-07-19 RX ORDER — FAMOTIDINE 20 MG/1
20 TABLET, FILM COATED ORAL 2 TIMES DAILY
Qty: 60 TABLET | Refills: 0 | Status: SHIPPED
Start: 2021-07-19 | End: 2021-08-03 | Stop reason: SDUPTHER

## 2021-08-03 DIAGNOSIS — R10.13 EPIGASTRIC PAIN: ICD-10-CM

## 2021-08-03 RX ORDER — FAMOTIDINE 20 MG/1
20 TABLET, FILM COATED ORAL 2 TIMES DAILY
Qty: 180 TABLET | Refills: 0 | Status: SHIPPED
Start: 2021-08-03 | End: 2021-09-01 | Stop reason: ALTCHOICE

## 2021-09-01 ENCOUNTER — OFFICE VISIT (OUTPATIENT)
Dept: PRIMARY CARE CLINIC | Age: 41
End: 2021-09-01
Payer: COMMERCIAL

## 2021-09-01 VITALS
HEART RATE: 72 BPM | DIASTOLIC BLOOD PRESSURE: 80 MMHG | TEMPERATURE: 97.6 F | SYSTOLIC BLOOD PRESSURE: 126 MMHG | WEIGHT: 138 LBS | BODY MASS INDEX: 22.27 KG/M2 | OXYGEN SATURATION: 99 %

## 2021-09-01 DIAGNOSIS — N39.0 URINARY TRACT INFECTION WITHOUT HEMATURIA, SITE UNSPECIFIED: Primary | ICD-10-CM

## 2021-09-01 LAB
APPEARANCE FLUID: NORMAL
BILIRUBIN, POC: NEGATIVE
BLOOD URINE, POC: NORMAL
CLARITY, POC: CLEAR
COLOR, POC: YELLOW
GLUCOSE URINE, POC: NEGATIVE
KETONES, POC: NEGATIVE
LEUKOCYTE EST, POC: NEGATIVE
NITRITE, POC: NEGATIVE
PH, POC: 6
PROTEIN, POC: NEGATIVE
SPECIFIC GRAVITY, POC: 1.02
UROBILINOGEN, POC: 0.2

## 2021-09-01 PROCEDURE — 99213 OFFICE O/P EST LOW 20 MIN: CPT | Performed by: FAMILY MEDICINE

## 2021-09-01 PROCEDURE — 81002 URINALYSIS NONAUTO W/O SCOPE: CPT | Performed by: FAMILY MEDICINE

## 2021-09-01 RX ORDER — NITROFURANTOIN 25; 75 MG/1; MG/1
100 CAPSULE ORAL 2 TIMES DAILY
Qty: 14 CAPSULE | Refills: 0 | Status: SHIPPED | OUTPATIENT
Start: 2021-09-01 | End: 2021-09-08

## 2021-09-01 ASSESSMENT — ENCOUNTER SYMPTOMS
NAUSEA: 0
ABDOMINAL PAIN: 1

## 2021-09-01 NOTE — PROGRESS NOTES
Juan Vickers, a female of 37 y.o. came to the office 9/1/2021. Patient Active Problem List   Diagnosis   (none) - all problems resolved or deleted          Urinary Tract Infection   This is a new problem. Episode onset: 10 days ago. The problem has been unchanged. The patient is experiencing no pain. There has been no fever. Associated symptoms include a discharge (at first but not in past 2-3 days. ) and frequency. Pertinent negatives include no chills, flank pain, hematuria, nausea or urgency. Associated symptoms comments: Urine odor and pressure  . She has tried home medications (thieves oil she mixes swith water and drinks bid-tid. ) for the symptoms. The treatment provided mild relief. FDLMP: 2.5 wks ago. Similar symptoms about 4 months ago with similar ua results. But UC sent out and grew > 100,000 E coli. [place] started and symptoms resolved. No Known Allergies    No current outpatient medications on file prior to visit. No current facility-administered medications on file prior to visit. Review of Systems   Constitutional: Negative for chills and fever. Gastrointestinal: Positive for abdominal pain (for 1 night recently. ). Negative for nausea. Genitourinary: Positive for frequency. Negative for difficulty urinating, dysuria, flank pain, hematuria and urgency. other review of systems reviewed and are negative    OBJECTIVE:  /80   Pulse 72   Temp 97.6 °F (36.4 °C)   Wt 138 lb (62.6 kg)   SpO2 99%   BMI 22.27 kg/m²      Physical Exam  Vitals reviewed. Eyes:      General: No scleral icterus. Conjunctiva/sclera: Conjunctivae normal.   Neck:      Thyroid: No thyromegaly. Cardiovascular:      Rate and Rhythm: Normal rate and regular rhythm. Heart sounds: No murmur heard. Pulmonary:      Effort: Pulmonary effort is normal.      Breath sounds: Normal breath sounds. No wheezing or rales.    Abdominal:      General: Bowel sounds are normal.      Palpations: Abdomen is soft. There is no mass. Tenderness: There is no abdominal tenderness. There is no guarding or rebound. Musculoskeletal:         General: Normal range of motion. Cervical back: Neck supple. Lymphadenopathy:      Cervical: No cervical adenopathy. Skin:     General: Skin is warm and dry. Neurological:      Mental Status: She is alert and oriented to person, place, and time. Psychiatric:         Mood and Affect: Mood normal.     Results for Lesly Guerrero \"YUNIOR\" (MRN [id number]) as of 9/1/2021 12:29   Ref. Range 9/1/2021 11:58   Protein, UA Unknown negative   Color, UA Unknown yellow   Clarity, UA Unknown clear   Glucose, UA POC Unknown negative   Bilirubin, UA Unknown negative   Ketones, UA Unknown negative   Spec Grav, UA Unknown 1.025   pH, UA Unknown 6.0   Urobilinogen, UA Unknown 0.2   Nitrite, UA Unknown negative   Leukocytes, UA Unknown negative   Blood, UA POC Unknown trace       ASSESSMENT AND PLAN:    Luis Coffman was seen today for urinary tract infection. Diagnoses and all orders for this visit:    Urinary tract infection without hematuria, site unspecified  -     POCT Urinalysis no Micro  -     nitrofurantoin, macrocrystal-monohydrate, (MACROBID) 100 MG capsule; Take 1 capsule by mouth 2 times daily for 7 days    - push fluids   - urinate when needs to and after intercourse  - try D- Mannose otc daily. Return if symptoms worsen or fail to improve.     Iban Mccain, DO

## 2022-03-25 ENCOUNTER — OFFICE VISIT (OUTPATIENT)
Dept: PRIMARY CARE CLINIC | Age: 42
End: 2022-03-25
Payer: COMMERCIAL

## 2022-03-25 VITALS
HEIGHT: 66 IN | TEMPERATURE: 97.2 F | HEART RATE: 82 BPM | SYSTOLIC BLOOD PRESSURE: 110 MMHG | WEIGHT: 147 LBS | OXYGEN SATURATION: 99 % | BODY MASS INDEX: 23.63 KG/M2 | DIASTOLIC BLOOD PRESSURE: 80 MMHG

## 2022-03-25 DIAGNOSIS — Z00.00 ANNUAL PHYSICAL EXAM: ICD-10-CM

## 2022-03-25 DIAGNOSIS — H91.8X2 OTHER SPECIFIED HEARING LOSS OF LEFT EAR, UNSPECIFIED HEARING STATUS ON CONTRALATERAL SIDE: ICD-10-CM

## 2022-03-25 DIAGNOSIS — H93.12 TINNITUS OF LEFT EAR: ICD-10-CM

## 2022-03-25 DIAGNOSIS — Z00.00 ANNUAL PHYSICAL EXAM: Primary | ICD-10-CM

## 2022-03-25 LAB
ALBUMIN SERPL-MCNC: 4.3 G/DL (ref 3.5–5.2)
ALP BLD-CCNC: 92 U/L (ref 35–104)
ALT SERPL-CCNC: 15 U/L (ref 0–32)
ANION GAP SERPL CALCULATED.3IONS-SCNC: 13 MMOL/L (ref 7–16)
AST SERPL-CCNC: 21 U/L (ref 0–31)
BILIRUB SERPL-MCNC: 0.7 MG/DL (ref 0–1.2)
BUN BLDV-MCNC: 7 MG/DL (ref 6–20)
CALCIUM SERPL-MCNC: 9 MG/DL (ref 8.6–10.2)
CHLORIDE BLD-SCNC: 103 MMOL/L (ref 98–107)
CHOLESTEROL, TOTAL: 170 MG/DL (ref 0–199)
CO2: 21 MMOL/L (ref 22–29)
CREAT SERPL-MCNC: 0.8 MG/DL (ref 0.5–1)
GFR AFRICAN AMERICAN: >60
GFR NON-AFRICAN AMERICAN: >60 ML/MIN/1.73
GLUCOSE BLD-MCNC: 77 MG/DL (ref 74–99)
HCT VFR BLD CALC: 39.7 % (ref 34–48)
HDLC SERPL-MCNC: 75 MG/DL
HEMOGLOBIN: 13.5 G/DL (ref 11.5–15.5)
LDL CHOLESTEROL CALCULATED: 83 MG/DL (ref 0–99)
MCH RBC QN AUTO: 30.3 PG (ref 26–35)
MCHC RBC AUTO-ENTMCNC: 34 % (ref 32–34.5)
MCV RBC AUTO: 89.2 FL (ref 80–99.9)
PDW BLD-RTO: 14.2 FL (ref 11.5–15)
PLATELET # BLD: 192 E9/L (ref 130–450)
PMV BLD AUTO: 11 FL (ref 7–12)
POTASSIUM SERPL-SCNC: 4 MMOL/L (ref 3.5–5)
RBC # BLD: 4.45 E12/L (ref 3.5–5.5)
SODIUM BLD-SCNC: 137 MMOL/L (ref 132–146)
TOTAL PROTEIN: 7.4 G/DL (ref 6.4–8.3)
TRIGL SERPL-MCNC: 61 MG/DL (ref 0–149)
VLDLC SERPL CALC-MCNC: 12 MG/DL
WBC # BLD: 7.6 E9/L (ref 4.5–11.5)

## 2022-03-25 PROCEDURE — 99396 PREV VISIT EST AGE 40-64: CPT | Performed by: FAMILY MEDICINE

## 2022-03-25 SDOH — ECONOMIC STABILITY: FOOD INSECURITY: WITHIN THE PAST 12 MONTHS, YOU WORRIED THAT YOUR FOOD WOULD RUN OUT BEFORE YOU GOT MONEY TO BUY MORE.: NEVER TRUE

## 2022-03-25 SDOH — ECONOMIC STABILITY: FOOD INSECURITY: WITHIN THE PAST 12 MONTHS, THE FOOD YOU BOUGHT JUST DIDN'T LAST AND YOU DIDN'T HAVE MONEY TO GET MORE.: NEVER TRUE

## 2022-03-25 ASSESSMENT — PATIENT HEALTH QUESTIONNAIRE - PHQ9
SUM OF ALL RESPONSES TO PHQ9 QUESTIONS 1 & 2: 0
2. FEELING DOWN, DEPRESSED OR HOPELESS: 0
SUM OF ALL RESPONSES TO PHQ QUESTIONS 1-9: 0
SUM OF ALL RESPONSES TO PHQ QUESTIONS 1-9: 0
1. LITTLE INTEREST OR PLEASURE IN DOING THINGS: 0
SUM OF ALL RESPONSES TO PHQ QUESTIONS 1-9: 0
SUM OF ALL RESPONSES TO PHQ QUESTIONS 1-9: 0

## 2022-03-25 ASSESSMENT — SOCIAL DETERMINANTS OF HEALTH (SDOH): HOW HARD IS IT FOR YOU TO PAY FOR THE VERY BASICS LIKE FOOD, HOUSING, MEDICAL CARE, AND HEATING?: NOT HARD AT ALL

## 2022-03-25 NOTE — PROGRESS NOTES
SUBJECTIVE:    HPI: Dea Lewis  Was seen here today for   Chief Complaint   Patient presents with    Annual Exam    Blood Work     drawn place orders    . She states they are dealing with decrease hearing left ear for months. Ringing in ears. 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 No pertinent family history. Prior to Admission medications    Not on File       Patient has no known allergies.     Social History     Tobacco Use    Smoking status: Never Smoker    Smokeless tobacco: Never Used   Substance Use Topics    Alcohol use: No         Review Of Systems:    Skin: no abnormal pigmentation, rash, scaling, itching, masses, hair or nail changes  Eyes: no blurring, diplopia, or eye pain  Ears/Nose/Throat: + hearing loss, + tinnitus, - vertigo, nosebleed, + nasal congestion, - rhinorrhea, sore throat  Respiratory: no cough, pleuritic chest pain, dyspnea, or wheezing  Cardiovascular: no chest pain, angina, dyspnea on exertion, orthopnea, PND, palpitations, or claudication  Gastrointestinal: no nausea, vomiting, heartburn, diarrhea, constipation, bloating,  abdominal pain, or blood per rectum  Genitourinary: no urinary urgency, frequency, dysuria, nocturia, hesitancy, or incontinence  Musculoskeletal: no arthritis, arthralgia, myalgia, weakness, or morning stiffness  Neurologic: no paralysis, paresis, paresthesia, seizures, tremors, or headaches  Hematologic/Lymphatic/Immunologic: no anemia, abnormal bleeding/bruising, fever, chills, night sweats, swollen glands, or unexplained weight loss  Endocrine: no heat or cold intolerance and no polyphagia, polydipsia, or polyuria      OBJECTIVE:    VS: /80   Pulse 82   Temp 97.2 °F (36.2 °C)   Ht 5' 6\" (1.676 m)   Wt 147 lb (66.7 kg)   SpO2 99%   BMI 23.73 kg/m²   General appearance: Alert, Awake, Oriented times 3, no distress  Skin: Warm and dry  Head: Normocephalic. No masses, lesions or tenderness noted  Eyes: Conjunctivae appear normal. PERRL  Ears: External ears normal, TM's clear and intact. Nose/Sinuses: Nares normal. Septum midline. Mucosa normal. No drainage  Oropharynx: Oropharynx clear with no exudate seen  Neck: Neck supple. No jugular venous distension, lymphadenopathy or thyromegaly Trachea midline. No carotid bruits  Lungs: Lungs clear to auscultation bilaterally. No rhonchi, crackles or wheezes  Heart: S1 S2  Regular rate and rhythm. No rub, murmur or gallop  Abdomen: Abdomen soft, non-tender. BS normal. No masses, organomegaly  Extremities: No edema, Peripheral pulses palpable  Musculoskeletal: Muscular strength appears intact. No joint effusion, tenderness, swelling or warmth  Neuro:  No focal motor or sensory deficits. 2+/4 L4 reflexes        ASSESSMENT/PLAN:  Flavio Fuller was seen today for annual exam and blood work. Diagnoses and all orders for this visit:    Annual physical exam  -     Lipid Panel; Future  -     Comprehensive Metabolic Panel; Future  -     CBC; Future    Other specified hearing loss of left ear, unspecified hearing status on contralateral side  -     [facility]    Tinnitus of left ear  -     [facility]       Return for based on results.     Aditya Mccain, DO

## 2022-03-28 ENCOUNTER — TELEPHONE (OUTPATIENT)
Dept: PRIMARY CARE CLINIC | Age: 42
End: 2022-03-28

## 2022-05-19 ENCOUNTER — HOSPITAL ENCOUNTER (OUTPATIENT)
Dept: AUDIOLOGY | Age: 42
Discharge: HOME | End: 2022-05-19
Payer: COMMERCIAL

## 2022-05-19 PROCEDURE — 92567 TYMPANOMETRY: CPT | Performed by: AUDIOLOGIST

## 2022-05-19 PROCEDURE — 92557 COMPREHENSIVE HEARING TEST: CPT | Performed by: AUDIOLOGIST

## 2022-08-12 ENCOUNTER — OFFICE VISIT (OUTPATIENT)
Dept: PRIMARY CARE CLINIC | Age: 42
End: 2022-08-12
Payer: COMMERCIAL

## 2022-08-12 VITALS
SYSTOLIC BLOOD PRESSURE: 116 MMHG | DIASTOLIC BLOOD PRESSURE: 74 MMHG | RESPIRATION RATE: 14 BRPM | OXYGEN SATURATION: 98 % | HEIGHT: 66 IN | WEIGHT: 150 LBS | HEART RATE: 82 BPM | TEMPERATURE: 97.3 F | BODY MASS INDEX: 24.11 KG/M2

## 2022-08-12 DIAGNOSIS — F41.9 ANXIETY: ICD-10-CM

## 2022-08-12 DIAGNOSIS — G44.52 NEW DAILY PERSISTENT HEADACHE: Primary | ICD-10-CM

## 2022-08-12 PROCEDURE — 99214 OFFICE O/P EST MOD 30 MIN: CPT | Performed by: FAMILY MEDICINE

## 2022-08-12 RX ORDER — NAPROXEN 500 MG/1
500 TABLET ORAL 2 TIMES DAILY WITH MEALS
Qty: 30 TABLET | Refills: 1 | Status: SHIPPED | OUTPATIENT
Start: 2022-08-12

## 2022-08-12 RX ORDER — LEVONORGESTREL 52 MG/1
1 INTRAUTERINE DEVICE INTRAUTERINE ONCE
COMMUNITY

## 2022-08-12 ASSESSMENT — ENCOUNTER SYMPTOMS
VOMITING: 0
VISUAL CHANGE: 0
NAUSEA: 1
PHOTOPHOBIA: 0

## 2022-08-12 NOTE — PROGRESS NOTES
Hoda Lozano, a female of 40 y.o. came to the office 8/12/2022. Patient Active Problem List   Diagnosis   (none) - all problems resolved or deleted          Headache   This is a new problem. The current episode started in the past 7 days (4). The pain is located in the Retro-orbital (R) region. Radiates to: R frontal scalp. The quality of the pain is described as throbbing. Associated symptoms include nausea. Pertinent negatives include no abnormal behavior, neck pain (but upper back pain.), phonophobia, photophobia, visual change or vomiting. The symptoms are aggravated by emotional stress. She has tried Excedrin and antidepressants (saw eye 2 days ago and told it may due to stress. having financial stress, work, kids.) for the symptoms. The treatment provided moderate relief. No Known Allergies    Current Outpatient Medications on File Prior to Visit   Medication Sig Dispense Refill    levonorgestrel (LILETTA, 52 MG,) 20.1 MCG/DAY IUD IUD 52 mg 1 each by IntraUTERine route once       No current facility-administered medications on file prior to visit. Review of Systems   Constitutional:  Positive for fatigue. Negative for activity change and appetite change. Eyes:  Negative for photophobia. Gastrointestinal:  Positive for nausea. Negative for vomiting. Musculoskeletal:  Negative for neck pain (but upper back pain. ). Neurological:  Positive for headaches. Psychiatric/Behavioral:  Positive for agitation (hs) and dysphoric mood. Negative for decreased concentration and sleep disturbance. The patient is nervous/anxious. other review of systems reviewed and are negative    OBJECTIVE:  /74   Pulse 82   Temp 97.3 °F (36.3 °C)   Resp 14   Ht 5' 6\" (1.676 m)   Wt 150 lb (68 kg)   SpO2 98%   BMI 24.21 kg/m²      Physical Exam  Vitals reviewed. Eyes:      General: No scleral icterus. Conjunctiva/sclera: Conjunctivae normal.   Neck:      Thyroid: No thyromegaly. Vascular: No carotid bruit. Cardiovascular:      Rate and Rhythm: Normal rate and regular rhythm. Heart sounds: No murmur heard. Pulmonary:      Effort: Pulmonary effort is normal.      Breath sounds: Normal breath sounds. No wheezing or rales. Musculoskeletal:         General: Normal range of motion. Cervical back: Neck supple. No spasms. Normal range of motion. Lymphadenopathy:      Cervical: No cervical adenopathy. Skin:     General: Skin is warm and dry. Neurological:      Mental Status: She is alert and oriented to person, place, and time. Cranial Nerves: No cranial nerve deficit. ASSESSMENT AND PLAN:    Hailee Gerber was seen today for headache. Diagnoses and all orders for this visit:    New daily persistent headache  -     naproxen (NAPROSYN) 500 MG tablet; Take 1 tablet by mouth in the morning and 1 tablet in the evening. Take with meals. Anxiety    - monitor moods if worsen consider med    Return if symptoms worsen or fail to improve.     Dilcia Mccain, DO

## 2023-04-05 ENCOUNTER — OFFICE VISIT (OUTPATIENT)
Dept: PRIMARY CARE CLINIC | Age: 43
End: 2023-04-05
Payer: COMMERCIAL

## 2023-04-05 VITALS
DIASTOLIC BLOOD PRESSURE: 80 MMHG | HEART RATE: 86 BPM | OXYGEN SATURATION: 100 % | BODY MASS INDEX: 22.44 KG/M2 | TEMPERATURE: 97.2 F | SYSTOLIC BLOOD PRESSURE: 110 MMHG | WEIGHT: 139 LBS

## 2023-04-05 DIAGNOSIS — A08.4 VIRAL GASTROENTERITIS: Primary | ICD-10-CM

## 2023-04-05 DIAGNOSIS — R82.90 FOUL SMELLING URINE: ICD-10-CM

## 2023-04-05 DIAGNOSIS — N30.00 ACUTE CYSTITIS WITHOUT HEMATURIA: ICD-10-CM

## 2023-04-05 LAB
APPEARANCE FLUID: ABNORMAL
BILIRUBIN, POC: ABNORMAL
BLOOD URINE, POC: ABNORMAL
CLARITY, POC: ABNORMAL
COLOR, POC: ABNORMAL
GLUCOSE URINE, POC: ABNORMAL
KETONES, POC: 80
LEUKOCYTE EST, POC: ABNORMAL
NITRITE, POC: POSITIVE
PH, POC: 6
PROTEIN, POC: 30
SPECIFIC GRAVITY, POC: 1.02
UROBILINOGEN, POC: 1

## 2023-04-05 PROCEDURE — 81002 URINALYSIS NONAUTO W/O SCOPE: CPT | Performed by: FAMILY MEDICINE

## 2023-04-05 PROCEDURE — 99213 OFFICE O/P EST LOW 20 MIN: CPT | Performed by: FAMILY MEDICINE

## 2023-04-05 RX ORDER — SULFAMETHOXAZOLE AND TRIMETHOPRIM 800; 160 MG/1; MG/1
1 TABLET ORAL 2 TIMES DAILY
Qty: 10 TABLET | Refills: 0 | Status: SHIPPED | OUTPATIENT
Start: 2023-04-05 | End: 2023-04-10

## 2023-04-05 SDOH — ECONOMIC STABILITY: INCOME INSECURITY: HOW HARD IS IT FOR YOU TO PAY FOR THE VERY BASICS LIKE FOOD, HOUSING, MEDICAL CARE, AND HEATING?: NOT HARD AT ALL

## 2023-04-05 SDOH — ECONOMIC STABILITY: FOOD INSECURITY: WITHIN THE PAST 12 MONTHS, THE FOOD YOU BOUGHT JUST DIDN'T LAST AND YOU DIDN'T HAVE MONEY TO GET MORE.: NEVER TRUE

## 2023-04-05 SDOH — ECONOMIC STABILITY: FOOD INSECURITY: WITHIN THE PAST 12 MONTHS, YOU WORRIED THAT YOUR FOOD WOULD RUN OUT BEFORE YOU GOT MONEY TO BUY MORE.: NEVER TRUE

## 2023-04-05 SDOH — ECONOMIC STABILITY: HOUSING INSECURITY
IN THE LAST 12 MONTHS, WAS THERE A TIME WHEN YOU DID NOT HAVE A STEADY PLACE TO SLEEP OR SLEPT IN A SHELTER (INCLUDING NOW)?: NO

## 2023-04-05 ASSESSMENT — ENCOUNTER SYMPTOMS
VOMITING: 0
ABDOMINAL PAIN: 1
NAUSEA: 1
DIARRHEA: 1
CONSTIPATION: 0
BLOATING: 1
ABDOMINAL DISTENTION: 1
BACK PAIN: 1

## 2023-04-05 ASSESSMENT — PATIENT HEALTH QUESTIONNAIRE - PHQ9
SUM OF ALL RESPONSES TO PHQ QUESTIONS 1-9: 0
SUM OF ALL RESPONSES TO PHQ QUESTIONS 1-9: 0
2. FEELING DOWN, DEPRESSED OR HOPELESS: 0
SUM OF ALL RESPONSES TO PHQ9 QUESTIONS 1 & 2: 0
SUM OF ALL RESPONSES TO PHQ QUESTIONS 1-9: 0
SUM OF ALL RESPONSES TO PHQ QUESTIONS 1-9: 0
1. LITTLE INTEREST OR PLEASURE IN DOING THINGS: 0

## 2023-04-05 NOTE — PROGRESS NOTES
Pablo Guillory, a female of 40 y.o. came to the office 4/5/2023. Patient Active Problem List   Diagnosis   (none) - all problems resolved or deleted          GI Problem  The primary symptoms include fever (T max 100.7 4/2.), abdominal pain, nausea (at times) and diarrhea (began 4/2 with 3 episodes. took Imodium which helped. ). Primary symptoms do not include vomiting or dysuria. The abdominal pain began more than 2 days ago (4/1 with cramping and then began with low back pain. ). The abdominal pain has been unchanged since its onset. The abdominal pain is located in the periumbilical region. The abdominal pain does not radiate. The abdominal pain is relieved by belching and passing flatus. The illness is also significant for bloating and back pain. The illness does not include chills or constipation. Associated symptoms comments: Increased bowel sounds. .      No Known Allergies    Current Outpatient Medications on File Prior to Visit   Medication Sig Dispense Refill    levonorgestrel (LILETTA, 52 MG,) 20.1 MCG/DAY IUD IUD 52 mg 1 each by IntraUTERine route once      naproxen (NAPROSYN) 500 MG tablet Take 1 tablet by mouth in the morning and 1 tablet in the evening. Take with meals. 30 tablet 1     No current facility-administered medications on file prior to visit. Review of Systems   Constitutional:  Positive for appetite change (decreased) and fever (T max 100.7 4/2.). Negative for chills. HENT:  Negative for mouth sores. Gastrointestinal:  Positive for abdominal distention, abdominal pain, bloating, diarrhea (began 4/2 with 3 episodes. took Imodium which helped.) and nausea (at times). Negative for constipation and vomiting. Genitourinary:  Negative for dysuria, frequency and urgency. Strong urine smell and cloudy. Musculoskeletal:  Positive for back pain.  other review of systems reviewed and are negative    OBJECTIVE:  /80   Pulse 86   Temp 97.2 °F (36.2 °C)   Wt

## 2023-04-19 ENCOUNTER — OFFICE VISIT (OUTPATIENT)
Dept: PRIMARY CARE CLINIC | Age: 43
End: 2023-04-19
Payer: COMMERCIAL

## 2023-04-19 VITALS
DIASTOLIC BLOOD PRESSURE: 64 MMHG | HEART RATE: 65 BPM | BODY MASS INDEX: 23.08 KG/M2 | OXYGEN SATURATION: 98 % | TEMPERATURE: 97.3 F | WEIGHT: 143 LBS | SYSTOLIC BLOOD PRESSURE: 118 MMHG

## 2023-04-19 DIAGNOSIS — Z00.00 ANNUAL PHYSICAL EXAM: Primary | ICD-10-CM

## 2023-04-19 DIAGNOSIS — Z00.00 ANNUAL PHYSICAL EXAM: ICD-10-CM

## 2023-04-19 LAB
ALBUMIN SERPL-MCNC: 4.3 G/DL (ref 3.5–5.2)
ALP SERPL-CCNC: 84 U/L (ref 35–104)
ALT SERPL-CCNC: 28 U/L (ref 0–32)
ANION GAP SERPL CALCULATED.3IONS-SCNC: 9 MMOL/L (ref 7–16)
AST SERPL-CCNC: 23 U/L (ref 0–31)
BILIRUB SERPL-MCNC: 0.4 MG/DL (ref 0–1.2)
BUN SERPL-MCNC: 9 MG/DL (ref 6–20)
CALCIUM SERPL-MCNC: 9 MG/DL (ref 8.6–10.2)
CHLORIDE SERPL-SCNC: 106 MMOL/L (ref 98–107)
CHOLESTEROL, TOTAL: 176 MG/DL (ref 0–199)
CO2 SERPL-SCNC: 23 MMOL/L (ref 22–29)
CREAT SERPL-MCNC: 0.7 MG/DL (ref 0.5–1)
ERYTHROCYTE [DISTWIDTH] IN BLOOD BY AUTOMATED COUNT: 13.7 FL (ref 11.5–15)
GLUCOSE SERPL-MCNC: 95 MG/DL (ref 74–99)
HCT VFR BLD AUTO: 39.4 % (ref 34–48)
HDLC SERPL-MCNC: 67 MG/DL
HGB BLD-MCNC: 13.3 G/DL (ref 11.5–15.5)
LDLC SERPL CALC-MCNC: 100 MG/DL (ref 0–99)
MCH RBC QN AUTO: 30.8 PG (ref 26–35)
MCHC RBC AUTO-ENTMCNC: 33.8 % (ref 32–34.5)
MCV RBC AUTO: 91.2 FL (ref 80–99.9)
PLATELET # BLD AUTO: 272 E9/L (ref 130–450)
PMV BLD AUTO: 10.8 FL (ref 7–12)
POTASSIUM SERPL-SCNC: 4.8 MMOL/L (ref 3.5–5)
PROT SERPL-MCNC: 7.2 G/DL (ref 6.4–8.3)
RBC # BLD AUTO: 4.32 E12/L (ref 3.5–5.5)
SODIUM SERPL-SCNC: 138 MMOL/L (ref 132–146)
TRIGL SERPL-MCNC: 47 MG/DL (ref 0–149)
VLDLC SERPL CALC-MCNC: 9 MG/DL
WBC # BLD: 5.5 E9/L (ref 4.5–11.5)

## 2023-04-19 PROCEDURE — 99396 PREV VISIT EST AGE 40-64: CPT | Performed by: FAMILY MEDICINE

## 2023-04-19 NOTE — PROGRESS NOTES
night sweats, swollen glands, or unexplained weight loss  Endocrine: no heat or cold intolerance and no polyphagia, polydipsia, or polyuria  Psych: anxiety at times. OBJECTIVE:    VS: /64   Pulse 65   Temp 97.3 °F (36.3 °C)   Wt 143 lb (64.9 kg)   SpO2 98%   BMI 23.08 kg/m²   General appearance: Alert, Awake, Oriented times 3, no distress  Skin: Warm and dry  Head: Normocephalic. No masses, lesions or tenderness noted  Eyes: Conjunctivae appear normal. PERRL  Ears: External ears normal, TM's clear and intact  Nose/Sinuses: Nares normal. Septum midline. Mucosa normal. No drainage  Oropharynx: Oropharynx clear with no exudate seen  Neck: Neck supple. No jugular venous distension, lymphadenopathy or thyromegaly Trachea midline. No carotid bruits  Lungs: Lungs clear to auscultation bilaterally. No rhonchi, crackles or wheezes  Heart: S1 S2  Regular rate and rhythm. No rub, murmur or gallop  Abdomen: Abdomen soft, non-tender. BS normal. No masses, organomegaly  Extremities: No edema, Peripheral pulses palpable  Musculoskeletal: Muscular strength appears intact. No joint effusion, tenderness, swelling or warmth  Neuro:  No focal motor or sensory deficits. 2+/4 L4 reflexes        ASSESSMENT/PLAN:  Sidra Cushing was seen today for annual exam.    Diagnoses and all orders for this visit:    Annual physical exam  -     Lipid Panel; Future  -     CBC; Future  -     Comprehensive Metabolic Panel; Future     - discussed anxiety and meds if need be. Return for based on lab results, as needed.     You Mccain,

## 2023-05-12 ENCOUNTER — TELEPHONE (OUTPATIENT)
Dept: PRIMARY CARE CLINIC | Age: 43
End: 2023-05-12

## 2023-05-12 NOTE — TELEPHONE ENCOUNTER
Pt called and states she thinks she has pink eye in her left eye. Started to get red, itchy, and yellow crust began last night. Pts daughter just got over having pink eye and pt thinks she got it from her. Pt wanted to know if the eye drop prescription can be called into [facility] on [address], please advise. Pt would like a call back from office once sent.

## 2023-05-13 ENCOUNTER — NURSE ONLY (OUTPATIENT)
Dept: FAMILY MEDICINE CLINIC | Age: 43
End: 2023-05-13
Payer: COMMERCIAL

## 2023-05-13 VITALS
DIASTOLIC BLOOD PRESSURE: 74 MMHG | WEIGHT: 144 LBS | OXYGEN SATURATION: 98 % | BODY MASS INDEX: 23.24 KG/M2 | TEMPERATURE: 97.9 F | SYSTOLIC BLOOD PRESSURE: 104 MMHG | HEART RATE: 77 BPM

## 2023-05-13 DIAGNOSIS — H10.33 ACUTE CONJUNCTIVITIS OF BOTH EYES, UNSPECIFIED ACUTE CONJUNCTIVITIS TYPE: Primary | ICD-10-CM

## 2023-05-13 PROCEDURE — 99213 OFFICE O/P EST LOW 20 MIN: CPT | Performed by: FAMILY MEDICINE

## 2023-05-13 RX ORDER — TOBRAMYCIN 3 MG/ML
1 SOLUTION/ DROPS OPHTHALMIC 3 TIMES DAILY
Qty: 5 ML | Refills: 0 | Status: SHIPPED | OUTPATIENT
Start: 2023-05-13 | End: 2023-05-18

## 2023-05-13 NOTE — PROGRESS NOTES
Kevin Smith (:  1980) is a 40 y.o. female,Established patient, here for evaluation of the following chief complaint(s):  Eye Problem (Daughter had pink eye -- using drops at home )         ASSESSMENT/PLAN:  1. Acute conjunctivitis of both eyes, unspecified acute conjunctivitis type  Comments:  switch to tobrex and see how she does  no contacts at least for a week      Return if symptoms worsen or fail to improve. Subjective   SUBJECTIVE/OBJECTIVE:  Here with pink eye   Her daughter had it last week and her eye drop had to be changed  She used her daughters first eye drop and now she thinks it is not working b/c it spread to the other eye  Started in the left used drops for 2 days and this am right eye is red now too'  They itch as well with some drainage          Review of Systems   Constitutional:  Negative for appetite change, fatigue and fever. HENT:  Negative for congestion, ear pain, sinus pain, sore throat and trouble swallowing. Eyes:  Positive for discharge, redness and itching. Negative for pain. Respiratory:  Negative for cough, chest tightness, shortness of breath and wheezing. Cardiovascular:  Negative for chest pain, palpitations and leg swelling. Gastrointestinal:  Negative for abdominal pain, constipation, diarrhea, nausea and vomiting. Endocrine: Negative for cold intolerance and heat intolerance. Genitourinary:  Negative for difficulty urinating, hematuria and pelvic pain. Musculoskeletal:  Negative for back pain, gait problem and joint swelling. Skin:  Negative for rash and wound. Neurological:  Negative for dizziness, syncope and headaches. Hematological:  Negative for adenopathy. Psychiatric/Behavioral:  Negative for confusion, sleep disturbance and suicidal ideas. Objective   Physical Exam  Vitals and nursing note reviewed. Constitutional:       General: She is not in acute distress. Appearance: She is well-developed.  She is not

## 2023-05-14 ASSESSMENT — ENCOUNTER SYMPTOMS
VOMITING: 0
SHORTNESS OF BREATH: 0
SINUS PAIN: 0
ABDOMINAL PAIN: 0
TROUBLE SWALLOWING: 0
EYE ITCHING: 1
BACK PAIN: 0
EYE REDNESS: 1
EYE DISCHARGE: 1
COUGH: 0
WHEEZING: 0
CONSTIPATION: 0
DIARRHEA: 0
EYE PAIN: 0
CHEST TIGHTNESS: 0
SORE THROAT: 0
NAUSEA: 0

## 2024-04-04 ENCOUNTER — TELEPHONE (OUTPATIENT)
Dept: PRIMARY CARE CLINIC | Age: 44
End: 2024-04-04

## 2024-04-04 NOTE — TELEPHONE ENCOUNTER
----- Message from Auracourt Jaime sent at 4/4/2024 10:32 AM EDT -----  Subject: Message to Provider    QUESTIONS  Information for Provider? Patient has had on and off hip pain for years   but it seems to come more often now. She doesn't feel like she needs an   urgent appt but wanted Dr Mccain to know.   ---------------------------------------------------------------------------  --------------  CALL BACK INFO  [phone]; OK to leave message on voicemail  ---------------------------------------------------------------------------  --------------  SCRIPT ANSWERS  Relationship to Patient? Self

## 2024-04-19 ENCOUNTER — OFFICE VISIT (OUTPATIENT)
Dept: PRIMARY CARE CLINIC | Age: 44
End: 2024-04-19
Payer: COMMERCIAL

## 2024-04-19 VITALS
HEIGHT: 66 IN | RESPIRATION RATE: 16 BRPM | HEART RATE: 69 BPM | SYSTOLIC BLOOD PRESSURE: 120 MMHG | TEMPERATURE: 97.7 F | DIASTOLIC BLOOD PRESSURE: 78 MMHG | BODY MASS INDEX: 23.63 KG/M2 | OXYGEN SATURATION: 100 % | WEIGHT: 147 LBS

## 2024-04-19 DIAGNOSIS — Z00.00 ANNUAL PHYSICAL EXAM: Primary | ICD-10-CM

## 2024-04-19 DIAGNOSIS — M70.62 TROCHANTERIC BURSITIS OF LEFT HIP: ICD-10-CM

## 2024-04-19 DIAGNOSIS — R82.90 FOUL SMELLING URINE: ICD-10-CM

## 2024-04-19 LAB
ALBUMIN: 4.6 G/DL (ref 3.5–5.2)
ALP BLD-CCNC: 89 U/L (ref 35–104)
ALT SERPL-CCNC: 14 U/L (ref 0–32)
ANION GAP SERPL CALCULATED.3IONS-SCNC: 17 MMOL/L (ref 7–16)
APPEARANCE FLUID: NORMAL
AST SERPL-CCNC: 16 U/L (ref 0–31)
BILIRUB SERPL-MCNC: 0.5 MG/DL (ref 0–1.2)
BILIRUBIN, POC: NORMAL
BLOOD URINE, POC: NORMAL
BUN BLDV-MCNC: 11 MG/DL (ref 6–20)
CALCIUM SERPL-MCNC: 9.2 MG/DL (ref 8.6–10.2)
CHLORIDE BLD-SCNC: 103 MMOL/L (ref 98–107)
CHOLESTEROL: 182 MG/DL
CLARITY, POC: NORMAL
CO2: 18 MMOL/L (ref 22–29)
COLOR, POC: YELLOW
CREAT SERPL-MCNC: 0.7 MG/DL (ref 0.5–1)
GFR SERPL CREATININE-BSD FRML MDRD: >90 ML/MIN/1.73M2
GLUCOSE BLD-MCNC: 88 MG/DL (ref 74–99)
GLUCOSE URINE, POC: NORMAL
HCT VFR BLD CALC: 43.1 % (ref 34–48)
HDLC SERPL-MCNC: 78 MG/DL
HEMOGLOBIN: 14.6 G/DL (ref 11.5–15.5)
KETONES, POC: NORMAL
LDL CHOLESTEROL: 96 MG/DL
LEUKOCYTE EST, POC: NORMAL
MCH RBC QN AUTO: 31.1 PG (ref 26–35)
MCHC RBC AUTO-ENTMCNC: 33.9 G/DL (ref 32–34.5)
MCV RBC AUTO: 91.7 FL (ref 80–99.9)
NITRITE, POC: NORMAL
PDW BLD-RTO: 13.4 % (ref 11.5–15)
PH, POC: 5.5
PLATELET # BLD: 268 K/UL (ref 130–450)
PMV BLD AUTO: 11 FL (ref 7–12)
POTASSIUM SERPL-SCNC: 4.4 MMOL/L (ref 3.5–5)
PROTEIN, POC: NORMAL
RBC # BLD: 4.7 M/UL (ref 3.5–5.5)
SODIUM BLD-SCNC: 138 MMOL/L (ref 132–146)
SPECIFIC GRAVITY, POC: 1.02
TOTAL PROTEIN: 7.6 G/DL (ref 6.4–8.3)
TRIGL SERPL-MCNC: 40 MG/DL
UROBILINOGEN, POC: 0.2
VLDLC SERPL CALC-MCNC: 8 MG/DL
WBC # BLD: 6.8 K/UL (ref 4.5–11.5)

## 2024-04-19 PROCEDURE — 99396 PREV VISIT EST AGE 40-64: CPT | Performed by: FAMILY MEDICINE

## 2024-04-19 PROCEDURE — 81002 URINALYSIS NONAUTO W/O SCOPE: CPT | Performed by: FAMILY MEDICINE

## 2024-04-19 SDOH — ECONOMIC STABILITY: INCOME INSECURITY: HOW HARD IS IT FOR YOU TO PAY FOR THE VERY BASICS LIKE FOOD, HOUSING, MEDICAL CARE, AND HEATING?: NOT HARD AT ALL

## 2024-04-19 SDOH — ECONOMIC STABILITY: FOOD INSECURITY: WITHIN THE PAST 12 MONTHS, THE FOOD YOU BOUGHT JUST DIDN'T LAST AND YOU DIDN'T HAVE MONEY TO GET MORE.: NEVER TRUE

## 2024-04-19 SDOH — ECONOMIC STABILITY: FOOD INSECURITY: WITHIN THE PAST 12 MONTHS, YOU WORRIED THAT YOUR FOOD WOULD RUN OUT BEFORE YOU GOT MONEY TO BUY MORE.: NEVER TRUE

## 2024-04-19 ASSESSMENT — PATIENT HEALTH QUESTIONNAIRE - PHQ9
SUM OF ALL RESPONSES TO PHQ QUESTIONS 1-9: 2
SUM OF ALL RESPONSES TO PHQ9 QUESTIONS 1 & 2: 2
SUM OF ALL RESPONSES TO PHQ QUESTIONS 1-9: 2
1. LITTLE INTEREST OR PLEASURE IN DOING THINGS: SEVERAL DAYS
2. FEELING DOWN, DEPRESSED OR HOPELESS: SEVERAL DAYS

## 2024-04-19 NOTE — PROGRESS NOTES
SUBJECTIVE:    HPI: Arti Sharpe  Was seen here today for   Chief Complaint   Patient presents with    Annual Exam    Hip Pain     Left side    Urinary Frequency     Sometimes cloudy urine and odor on and off for a year    .  She states they are dealing with cloudy urine.    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 No pertinent family history.    Prior to Admission medications    Medication Sig Start Date End Date Taking? Authorizing Provider   levonorgestrel (LILETTA, 52 MG,) 20.1 MCG/DAY IUD IUD 52 mg 1 each by IntraUTERine route once   Yes Provider, MD Rj       Patient has no known allergies.    Social History     Tobacco Use    Smoking status: Never    Smokeless tobacco: Never   Substance Use Topics    Alcohol use: No         Review Of Systems:    Skin: no abnormal pigmentation, rash, scaling, itching, masses, hair or nail changes  Eyes: no blurring, diplopia, or eye pain  Ears/Nose/Throat: no hearing loss, tinnitus, vertigo, nosebleed, nasal congestion, rhinorrhea, sore throat  Respiratory: no cough, pleuritic chest pain, dyspnea, or wheezing  Cardiovascular: no chest pain, angina, dyspnea on exertion, orthopnea, PND, palpitations, or claudication  Gastrointestinal: no nausea, vomiting, heartburn, diarrhea, constipation, bloating,  abdominal pain, or blood per rectum  Genitourinary: no urinary urgency, frequency, dysuria, nocturia, hesitancy, or incontinence. + cloudy urine with odor on and off since last uti 1 yrs ago. Slight urgency.  Musculoskeletal: no arthritis, arthralgia, myalgia, weakness, or morning stiffness. At times left leg feels cold. Pain left hip but not into groin. No inj or trauma. Works out with occas pain.   Neurologic: no paralysis, paresis, paresthesia,

## 2024-04-21 LAB
CULTURE: ABNORMAL
SPECIMEN DESCRIPTION: ABNORMAL

## 2024-04-23 ENCOUNTER — TELEPHONE (OUTPATIENT)
Dept: PRIMARY CARE CLINIC | Age: 44
End: 2024-04-23

## 2024-04-23 RX ORDER — LEVOFLOXACIN 250 MG/1
250 TABLET, FILM COATED ORAL DAILY
Qty: 3 TABLET | Refills: 0 | Status: SHIPPED | OUTPATIENT
Start: 2024-04-23 | End: 2024-04-26

## 2024-04-23 NOTE — TELEPHONE ENCOUNTER
Spoke with patient her urinalysis is positive for E. coli greater than 100,000 colonies.  We will start her on Levaquin 250 mg 1 daily for 3 days to knock this out.  If no improvement with this let us know.

## 2024-07-15 ENCOUNTER — OFFICE VISIT (OUTPATIENT)
Dept: PRIMARY CARE CLINIC | Age: 44
End: 2024-07-15
Payer: COMMERCIAL

## 2024-07-15 VITALS
OXYGEN SATURATION: 98 % | TEMPERATURE: 97.8 F | SYSTOLIC BLOOD PRESSURE: 122 MMHG | BODY MASS INDEX: 23.89 KG/M2 | DIASTOLIC BLOOD PRESSURE: 74 MMHG | HEART RATE: 70 BPM | WEIGHT: 148 LBS

## 2024-07-15 DIAGNOSIS — R35.0 URINARY FREQUENCY: ICD-10-CM

## 2024-07-15 DIAGNOSIS — N30.00 ACUTE CYSTITIS WITHOUT HEMATURIA: Primary | ICD-10-CM

## 2024-07-15 LAB
APPEARANCE FLUID: NORMAL
BILIRUBIN, POC: NORMAL
BLOOD URINE, POC: NORMAL
CLARITY, POC: NORMAL
COLOR, POC: YELLOW
GLUCOSE URINE, POC: NORMAL
KETONES, POC: NORMAL
LEUKOCYTE EST, POC: NORMAL
NITRITE, POC: NORMAL
PH, POC: 6.5
PROTEIN, POC: NORMAL
SPECIFIC GRAVITY, POC: 1.02
UROBILINOGEN, POC: 0.2

## 2024-07-15 PROCEDURE — 99213 OFFICE O/P EST LOW 20 MIN: CPT | Performed by: FAMILY MEDICINE

## 2024-07-15 PROCEDURE — 81002 URINALYSIS NONAUTO W/O SCOPE: CPT | Performed by: FAMILY MEDICINE

## 2024-07-15 RX ORDER — LEVOFLOXACIN 250 MG/1
250 TABLET, FILM COATED ORAL DAILY
Qty: 3 TABLET | Refills: 0 | Status: SHIPPED | OUTPATIENT
Start: 2024-07-15 | End: 2024-07-18

## 2024-07-15 NOTE — PROGRESS NOTES
Color, UA yellow   Clarity, UA cloudy   Glucose, UA POC neg   Bilirubin, UA neg   Ketones, UA neg   Spec Grav, UA 1.025   pH, UA 6.5   Urobilinogen, UA 0.2   Nitrite, UA neg   Leukocytes, UA small   Blood, UA POC moderate       ASSESSMENT AND PLAN:    Arti was seen today for urinary tract infection.    Diagnoses and all orders for this visit:    Acute cystitis without hematuria  -     Culture, Urine; Future  -     levoFLOXacin (LEVAQUIN) 250 MG tablet; Take 1 tablet by mouth daily for 3 days    Urinary frequency  -     POCT Urinalysis no Micro    - hold Levaquin until give urine sample in am.     Return for based on results.    Sonido Mccain, DO

## 2024-07-16 ENCOUNTER — LAB (OUTPATIENT)
Dept: PRIMARY CARE CLINIC | Age: 44
End: 2024-07-16

## 2024-07-16 DIAGNOSIS — N30.00 ACUTE CYSTITIS WITHOUT HEMATURIA: ICD-10-CM

## 2024-07-19 LAB
CULTURE: ABNORMAL
SPECIMEN DESCRIPTION: ABNORMAL

## 2024-12-17 ENCOUNTER — OFFICE VISIT (OUTPATIENT)
Dept: PRIMARY CARE CLINIC | Age: 44
End: 2024-12-17
Payer: COMMERCIAL

## 2024-12-17 VITALS
TEMPERATURE: 97 F | HEART RATE: 67 BPM | BODY MASS INDEX: 23.89 KG/M2 | SYSTOLIC BLOOD PRESSURE: 122 MMHG | DIASTOLIC BLOOD PRESSURE: 80 MMHG | OXYGEN SATURATION: 99 % | WEIGHT: 148 LBS

## 2024-12-17 DIAGNOSIS — R35.0 FREQUENT URINATION: Primary | ICD-10-CM

## 2024-12-17 LAB
APPEARANCE FLUID: NORMAL
BILIRUBIN, POC: NORMAL
BLOOD URINE, POC: NORMAL
CLARITY, POC: NORMAL
COLOR, POC: YELLOW
GLUCOSE URINE, POC: NORMAL MG/DL
KETONES, POC: NORMAL MG/DL
LEUKOCYTE EST, POC: NORMAL
NITRITE, POC: NORMAL
PH, POC: 6.5
PROTEIN, POC: NORMAL MG/DL
SPECIFIC GRAVITY, POC: 1.02
UROBILINOGEN, POC: 0.2 MG/DL

## 2024-12-17 PROCEDURE — 81002 URINALYSIS NONAUTO W/O SCOPE: CPT | Performed by: FAMILY MEDICINE

## 2024-12-17 PROCEDURE — 99213 OFFICE O/P EST LOW 20 MIN: CPT | Performed by: FAMILY MEDICINE

## 2024-12-17 ASSESSMENT — ENCOUNTER SYMPTOMS: BACK PAIN: 0

## 2024-12-19 ENCOUNTER — TELEPHONE (OUTPATIENT)
Dept: PRIMARY CARE CLINIC | Age: 44
End: 2024-12-19

## 2024-12-19 LAB
CULTURE: ABNORMAL
SPECIMEN DESCRIPTION: ABNORMAL

## 2024-12-19 RX ORDER — CIPROFLOXACIN 250 MG/1
250 TABLET, FILM COATED ORAL 2 TIMES DAILY
Qty: 6 TABLET | Refills: 0 | Status: SHIPPED | OUTPATIENT
Start: 2024-12-19 | End: 2024-12-22

## 2024-12-19 NOTE — TELEPHONE ENCOUNTER
Spoke with patient urinalysis shows greater than 100,000 colonies of E. coli.  This is sensitive to Cipro.  We will put her on Cipro 250 mg twice a day for 3 days.  Her urine symptoms are doing okay at present

## 2025-04-04 ENCOUNTER — TELEPHONE (OUTPATIENT)
Dept: PRIMARY CARE CLINIC | Age: 45
End: 2025-04-04

## 2025-04-04 NOTE — TELEPHONE ENCOUNTER
Raissa is requesting you call her in an antibiotic for a reoccurring bladder infection.    [facility]        Thank you

## 2025-04-24 ENCOUNTER — OFFICE VISIT (OUTPATIENT)
Dept: PRIMARY CARE CLINIC | Age: 45
End: 2025-04-24
Payer: COMMERCIAL

## 2025-04-24 VITALS
OXYGEN SATURATION: 99 % | HEIGHT: 66 IN | SYSTOLIC BLOOD PRESSURE: 110 MMHG | WEIGHT: 147 LBS | RESPIRATION RATE: 16 BRPM | BODY MASS INDEX: 23.63 KG/M2 | DIASTOLIC BLOOD PRESSURE: 82 MMHG | TEMPERATURE: 97.4 F | HEART RATE: 74 BPM

## 2025-04-24 DIAGNOSIS — R30.0 DYSURIA: ICD-10-CM

## 2025-04-24 DIAGNOSIS — R82.90 FOUL SMELLING URINE: ICD-10-CM

## 2025-04-24 DIAGNOSIS — Z00.00 ANNUAL PHYSICAL EXAM: Primary | ICD-10-CM

## 2025-04-24 LAB
ALBUMIN: 4.4 G/DL (ref 3.5–5.2)
ALP BLD-CCNC: 92 U/L (ref 35–104)
ALT SERPL-CCNC: 25 U/L (ref 0–35)
ANION GAP SERPL CALCULATED.3IONS-SCNC: 13 MMOL/L (ref 7–16)
AST SERPL-CCNC: 28 U/L (ref 0–35)
BILIRUB SERPL-MCNC: 0.7 MG/DL (ref 0–1.2)
BILIRUBIN, POC: NORMAL
BLOOD URINE, POC: NORMAL
BUN BLDV-MCNC: 8 MG/DL (ref 6–20)
CALCIUM SERPL-MCNC: 9.4 MG/DL (ref 8.6–10)
CHLORIDE BLD-SCNC: 103 MMOL/L (ref 98–107)
CHOLESTEROL, TOTAL: 184 MG/DL
CLARITY, POC: NORMAL
CO2: 22 MMOL/L (ref 22–29)
COLOR, POC: NORMAL
CREAT SERPL-MCNC: 0.8 MG/DL (ref 0.5–1)
GFR, ESTIMATED: >90 ML/MIN/1.73M2
GLUCOSE BLD-MCNC: 97 MG/DL (ref 74–99)
GLUCOSE URINE, POC: NORMAL MG/DL
HCT VFR BLD CALC: 44.6 % (ref 34–48)
HDLC SERPL-MCNC: 78 MG/DL
HEMOGLOBIN: 14.7 G/DL (ref 11.5–15.5)
KETONES, POC: NORMAL MG/DL
LDL CHOLESTEROL: 93 MG/DL
LEUKOCYTE EST, POC: NORMAL
MCH RBC QN AUTO: 31.3 PG (ref 26–35)
MCHC RBC AUTO-ENTMCNC: 33 G/DL (ref 32–34.5)
MCV RBC AUTO: 95.1 FL (ref 80–99.9)
NITRITE, POC: NORMAL
PDW BLD-RTO: 13.2 % (ref 11.5–15)
PH, POC: 7
PLATELET # BLD: 295 K/UL (ref 130–450)
PMV BLD AUTO: 10.7 FL (ref 7–12)
POTASSIUM SERPL-SCNC: 3.8 MMOL/L (ref 3.5–5.1)
PROTEIN, POC: NORMAL MG/DL
RBC # BLD: 4.69 M/UL (ref 3.5–5.5)
SODIUM BLD-SCNC: 138 MMOL/L (ref 136–145)
SPECIFIC GRAVITY, POC: 1.01
TOTAL PROTEIN: 7.6 G/DL (ref 6.4–8.3)
TRIGL SERPL-MCNC: 66 MG/DL
UROBILINOGEN, POC: 0.2 MG/DL
VLDLC SERPL CALC-MCNC: 13 MG/DL
WBC # BLD: 7.6 K/UL (ref 4.5–11.5)

## 2025-04-24 PROCEDURE — 36415 COLL VENOUS BLD VENIPUNCTURE: CPT | Performed by: FAMILY MEDICINE

## 2025-04-24 PROCEDURE — 99396 PREV VISIT EST AGE 40-64: CPT | Performed by: FAMILY MEDICINE

## 2025-04-24 PROCEDURE — 81002 URINALYSIS NONAUTO W/O SCOPE: CPT | Performed by: FAMILY MEDICINE

## 2025-04-24 SDOH — ECONOMIC STABILITY: FOOD INSECURITY: WITHIN THE PAST 12 MONTHS, THE FOOD YOU BOUGHT JUST DIDN'T LAST AND YOU DIDN'T HAVE MONEY TO GET MORE.: NEVER TRUE

## 2025-04-24 SDOH — ECONOMIC STABILITY: FOOD INSECURITY: WITHIN THE PAST 12 MONTHS, YOU WORRIED THAT YOUR FOOD WOULD RUN OUT BEFORE YOU GOT MONEY TO BUY MORE.: NEVER TRUE

## 2025-04-24 ASSESSMENT — PATIENT HEALTH QUESTIONNAIRE - PHQ9
SUM OF ALL RESPONSES TO PHQ QUESTIONS 1-9: 0
1. LITTLE INTEREST OR PLEASURE IN DOING THINGS: NOT AT ALL
SUM OF ALL RESPONSES TO PHQ QUESTIONS 1-9: 0
2. FEELING DOWN, DEPRESSED OR HOPELESS: NOT AT ALL

## 2025-04-24 NOTE — PROGRESS NOTES
SUBJECTIVE:    HPI: Arti Sharpe  Was seen here today for   Chief Complaint   Patient presents with    Annual Exam    Dysuria    .  She states they are dealing with no new medical issues. Start counseling for anxiety and depression at [facility] out of [place].  It is helping.     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 No pertinent family history.    Prior to Admission medications    Medication Sig Start Date End Date Taking? Authorizing Provider   levonorgestrel (LILETTA, 52 MG,) 20.1 MCG/DAY IUD IUD 52 mg 1 each by IntraUTERine route once   Yes Provider, MD Rj       Patient has no known allergies.    Social History     Tobacco Use    Smoking status: Never    Smokeless tobacco: Never   Substance Use Topics    Alcohol use: No         Review Of Systems:    Skin: no abnormal pigmentation, rash, scaling, itching, masses, hair or nail changes  Eyes: no blurring, diplopia, or eye pain  Ears/Nose/Throat: no hearing loss, tinnitus, vertigo, nosebleed, nasal congestion, rhinorrhea, sore throat  Respiratory: no cough, pleuritic chest pain, dyspnea, or wheezing  Cardiovascular: no chest pain, angina, dyspnea on exertion, orthopnea, PND, palpitations, or claudication  Gastrointestinal: no nausea, vomiting, heartburn, diarrhea, constipation, bloating,  abdominal pain, or blood per rectum  Genitourinary: no urinary frequency, dysuria, nocturia, hesitancy, or incontinence, but urine cloudy with some smell and some urgency.  Musculoskeletal: no arthritis, arthralgia, myalgia, weakness, or morning stiffness  Neurologic: no paralysis, paresis, paresthesia, seizures, tremors, or headaches  Hematologic/Lymphatic/Immunologic: no anemia, abnormal bleeding/bruising, fever, chills, night sweats,

## 2025-04-25 ENCOUNTER — RESULTS FOLLOW-UP (OUTPATIENT)
Dept: PRIMARY CARE CLINIC | Age: 45
End: 2025-04-25

## 2025-04-25 RX ORDER — SULFAMETHOXAZOLE AND TRIMETHOPRIM 800; 160 MG/1; MG/1
1 TABLET ORAL 2 TIMES DAILY
Qty: 10 TABLET | Refills: 0 | Status: SHIPPED | OUTPATIENT
Start: 2025-04-25 | End: 2025-04-30

## 2025-04-25 NOTE — TELEPHONE ENCOUNTER
Spoke with patient discussed normal labs.  Also discussed her urinalysis which shows some small leukocytes and cloudy urine.  We will start her on Bactrim DS twice a day for 5 days for urinary tract infection.  If no improvement with this or worsens follow-up needed.